# Patient Record
Sex: FEMALE | Race: WHITE | NOT HISPANIC OR LATINO | Employment: FULL TIME | URBAN - NONMETROPOLITAN AREA
[De-identification: names, ages, dates, MRNs, and addresses within clinical notes are randomized per-mention and may not be internally consistent; named-entity substitution may affect disease eponyms.]

---

## 2017-07-17 ENCOUNTER — OFFICE VISIT (OUTPATIENT)
Dept: URGENT CARE | Facility: PHYSICIAN GROUP | Age: 22
End: 2017-07-17
Payer: COMMERCIAL

## 2017-07-17 VITALS
WEIGHT: 149 LBS | SYSTOLIC BLOOD PRESSURE: 136 MMHG | BODY MASS INDEX: 25.44 KG/M2 | TEMPERATURE: 98.1 F | HEART RATE: 90 BPM | OXYGEN SATURATION: 95 % | HEIGHT: 64 IN | DIASTOLIC BLOOD PRESSURE: 88 MMHG

## 2017-07-17 DIAGNOSIS — S90.31XA CONTUSION OF RIGHT FOOT, INITIAL ENCOUNTER: ICD-10-CM

## 2017-07-17 PROCEDURE — 99213 OFFICE O/P EST LOW 20 MIN: CPT | Performed by: PHYSICIAN ASSISTANT

## 2017-07-17 NOTE — PROGRESS NOTES
Chief Complaint   Patient presents with   • Foot Problem     Right foot injury/whole foot now hurting x2 days ago patient dropped car part on foot Was prev seem [place] [facility] would like 2nd opinion        HISTORY OF PRESENT ILLNESS: Patient is a 22 y.o. female who presents today for evaluation of right foot pain. Patient dropped a car part on her right foot on Saturday, 2 days ago. She was seen in the emergency room in [place]. She had a negative x-ray but was upset that after she was told it wasn't broken they just sent her out of the ER without any information. Patient is having a hard time walking on it. She was given some ibuprofen 800s seemed to take the edge off but she continues to have swelling. She denies distal paresthesias.    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    Current Outpatient Prescriptions Ordered in [facility]   Medication Sig Dispense Refill   • norgestrel-ethinyl estradiol (LO-OVRAL) 0.3-30 MG-MCG Tab Take 1 Tab by mouth every day.     • LEVOXYL 112 MCG TABS Take 1 Tab by mouth every day. 30 Tab 3   • guaifenesin-codeine (CHERATUSSIN AC) Solution oral solution Take 5 mL by mouth every four hours as needed for Cough. 120 mL 0   • Prenatal Multivit-Min-Fe-FA (PRE-MIGUEL PO) Take  by mouth.       No current Epic-ordered facility-administered medications on file.       Past Medical History   Diagnosis Date   • Unspecified disorder of thyroid        Social History   Substance Use Topics   • Smoking status: Current Every Day Smoker -- 0.50 packs/day for 4 years     Types: Cigarettes   • Smokeless tobacco: Never Used   • Alcohol Use: No      Comment: 1 per week       No family status information on file.   No family history on file.    ROS:   Review of Systems   Constitutional: Negative for fever, chills, weight loss and malaise/fatigue.   HENT: Negative for  "ear pain, nosebleeds, congestion, sore throat and neck pain.    Eyes: Negative for blurred vision.   Respiratory: Negative for cough, sputum production, shortness of breath and wheezing.    Cardiovascular: Negative for chest pain, palpitations, orthopnea and leg swelling.   Gastrointestinal: Negative for heartburn, nausea, vomiting and abdominal pain.   Genitourinary: Negative for dysuria, urgency and frequency.       Exam:  Blood pressure 136/88, pulse 90, temperature 36.7 °C (98.1 °F), height 1.626 m (5' 4.02\"), weight 67.586 kg (149 lb), SpO2 95 %.  General: Normal appearing. No distress.  HEENT: Head is grossly normal.  Pulmonary: No respiratory distress noted.  Cardiovascular: Cap refill is less than 2 seconds in the right foot.  Neurologic: No sensory deficit noted.  Extremities: Diffuse edema of the right foot with ecchymosis around the toes. Antalgic gait noted. No localized tenderness noted.  Skin: No obvious lesions.  Psych: Normal mood. Alert and oriented x3. Judgment and insight is normal.    Assessment/Plan:  Discussed with patient that if she had a negative x-ray 2 days ago without additional injury, it is unlikely that there would be any new findings on x-ray. Recommend follow-up with primary care provider for repeat x-ray in 7-10 days to rule out occult fracture. Continue ibuprofen as directed. Discussed appropriate over-the-counter symptomatic medication, and when to return to clinic.  1. Contusion of right foot, initial encounter           "

## 2017-07-17 NOTE — MR AVS SNAPSHOT
"        Bernard Kendall   2017 9:35 AM   Office Visit   MRN: [id number]    Department:  [facility]   Dept Phone:  [phone]    Description:  Female : 1995   Provider:  Loretta Galarza PA-C           Reason for Visit     Foot Problem Right foot injury/whole foot now hurting x2 days ago patient dropped car part on foot Was prev seem [facility] would like 2nd opinion       Allergies as of 2017     No Known Allergies      You were diagnosed with     Contusion of right foot, initial encounter   [[id number]]         Vital Signs     Blood Pressure Pulse Temperature Height Weight Body Mass Index    136/88 mmHg 90 36.7 °C (98.1 °F) 1.626 m (5' 4.02\") 67.586 kg (149 lb) 25.56 kg/m2    Oxygen Saturation Smoking Status                95% Current Every Day Smoker          Basic Information     Date Of Birth Sex Race Ethnicity Preferred Language    1995 Female White Non- English      Problem List              ICD-10-CM Priority Class Noted - Resolved    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      Below and/or attached are the medications your provider expects you to take. Review all of your home medications and newly ordered medications with your provider and/or pharmacist. Follow medication instructions as directed by your provider and/or pharmacist. Please keep your medication list with you and share with your provider. Update the information when " medications are discontinued, doses are changed, or new medications (including over-the-counter products) are added; and carry medication information at all times in the event of emergency situations     Allergies:  No Known Allergies          Medications  Valid as of: July 17, 2017 - 11:08 AM    Generic Name Brand Name Tablet Size Instructions for use    Guaifenesin-Codeine (Solution) ROBITUSSIN -10 mg/5mL Take 5 mL by mouth every four hours as needed for Cough.        Levothyroxine Sodium (Tab) LEVOXYL 112 MCG Take 1 Tab by mouth every day.        Norgestrel-Ethinyl Estradiol (Tab) LO-OVRAL 0.3-30 MG-MCG Take 1 Tab by mouth every day.        Prenatal Multivit-Min-Fe-FA   Take  by mouth.        .                 Medicines prescribed today were sent to:     [facility] [address] [place] [address] - [address] [facility] [address] [place] [address] & [place]    [address] [facility] [address] [facility] [address]    Phone: [phone] Fax: [phone]    Open 24 Hours?: No      Medication refill instructions:       If your prescription bottle indicates you have medication refills left, it is not necessary to call your provider’s office. Please contact your pharmacy and they will refill your medication.    If your prescription bottle indicates you do not have any refills left, you may request refills at any time through one of the following ways: The online Couplewise system (except Urgent Care), by calling your provider’s office, or by asking your pharmacy to contact your provider’s office with a refill request. Medication refills are processed only during regular business hours and may not be available until the next business day. Your provider may request additional information or to have a follow-up visit with you prior to refilling your medication.   *Please Note: Medication refills are assigned a new Rx number when refilled electronically. Your pharmacy may indicate that no refills were authorized even though a new  prescription for the same medication is available at the pharmacy. Please request the medicine by name with the pharmacy before contacting your provider for a refill.           Buzzmove Access Code: [email or web address]  Expires: 8/16/2017 11:08 AM    Buzzmove  A secure, online tool to manage your health information     [employer]’s Buzzmove® is a secure, online tool that connects you to your personalized health information from the privacy of your home -- day or night - making it very easy for you to manage your healthcare. Once the activation process is completed, you can even access your medical information using the Buzzmove rebel, which is available for free in the Apple Rebel store or Google Play store.     Buzzmove provides the following levels of access (as shown below):   My Chart Features   [facility] Doctor [facility]  Urgent  Care Non-[facility]  Primary Care  Doctor   Email your healthcare team securely and privately 24/7 X X X    Manage appointments: schedule your next appointment; view details of past/upcoming appointments X      Request prescription refills. X      View recent personal medical records, including lab and immunizations X X X X   View health record, including health history, allergies, medications X X X X   Read reports about your outpatient visits, procedures, consult and ER notes X X X X   See your discharge summary, which is a recap of your hospital and/or ER visit that includes your diagnosis, lab results, and care plan. X X       How to register for Buzzmove:  1. Go to  [email or web address].  2. Click on the Sign Up Now box, which takes you to the New Member Sign Up page. You will need to provide the following information:  a. Enter your Buzzmove Access Code exactly as it appears at the top of this page. (You will not need to use this code after you’ve completed the sign-up process. If you do not sign up before the expiration date, you must request a new code.)    b. Enter your date of birth.   c. Enter your home email address.   d. Click Submit, and follow the next screen’s instructions.  3. Create a Immunetics ID. This will be your Immunetics login ID and cannot be changed, so think of one that is secure and easy to remember.  4. Create a Work4t password. You can change your password at any time.  5. Enter your Password Reset Question and Answer. This can be used at a later time if you forget your password.   6. Enter your e-mail address. This allows you to receive e-mail notifications when new information is available in Immunetics.  7. Click Sign Up. You can now view your health information.    For assistance activating your Immunetics account, call [phone]        Quit Tobacco Information     Do you want to quit using tobacco?    Quitting tobacco decreases risks of cancer, heart and lung disease, increases life expectancy, improves sense of taste and smell, and increases spending money, among other benefits.    If you are thinking about quitting, we can help.  • [facility] Quit Tobacco Program: [phone]  o Program occurs weekly for four weeks and includes pharmacist consultation on products to support quitting smoking or chewing tobacco. A provider referral is needed for pharmacist consultation.  • Tobacco Users Help Hotline: 7-212-QUITNOW (027-0369) or https://[place].quitlogix.org/  o Free, confidential telephone and online coaching for [place] residents. Sessions are designed on a schedule that is convenient for you. Eligible clients receive free nicotine replacement therapy.  • Nationally: www.smokefree.gov  o Information and professional assistance to support both immediate and long-term needs as you become, and remain, a non-smoker. Smokefree.gov allows you to choose the help that best fits your needs.

## 2018-03-07 ENCOUNTER — GYNECOLOGY VISIT (OUTPATIENT)
Dept: OBGYN | Facility: CLINIC | Age: 23
End: 2018-03-07
Payer: COMMERCIAL

## 2018-03-07 ENCOUNTER — HOSPITAL ENCOUNTER (OUTPATIENT)
Facility: MEDICAL CENTER | Age: 23
End: 2018-03-07
Attending: OBSTETRICS & GYNECOLOGY
Payer: COMMERCIAL

## 2018-03-07 VITALS
WEIGHT: 149 LBS | HEIGHT: 64 IN | DIASTOLIC BLOOD PRESSURE: 62 MMHG | BODY MASS INDEX: 25.44 KG/M2 | SYSTOLIC BLOOD PRESSURE: 90 MMHG

## 2018-03-07 DIAGNOSIS — Z01.419 WELL WOMAN EXAM: ICD-10-CM

## 2018-03-07 DIAGNOSIS — Z12.4 ROUTINE CERVICAL SMEAR: ICD-10-CM

## 2018-03-07 DIAGNOSIS — Z11.3 SCREENING EXAMINATION FOR VENEREAL DISEASE: ICD-10-CM

## 2018-03-07 PROCEDURE — 87491 CHLMYD TRACH DNA AMP PROBE: CPT

## 2018-03-07 PROCEDURE — 99395 PREV VISIT EST AGE 18-39: CPT | Performed by: OBSTETRICS & GYNECOLOGY

## 2018-03-07 PROCEDURE — 87591 N.GONORRHOEAE DNA AMP PROB: CPT

## 2018-03-07 PROCEDURE — 88175 CYTOPATH C/V AUTO FLUID REDO: CPT

## 2018-03-07 NOTE — PROGRESS NOTES
Bernard Kendall is a 22 y.o.  female who presents for her Annual Gynecologic Exam      HPI Comments: Pt presents for her annual well woman exam.   Pt has no complaints. Wants to get pregnant.  Patient's last menstrual period was 2018.  Prior patient of mine from [facility]    Review of Systems:   Pertinent positives documented in HPI and all other systems reviewed & are negative    PGYN Hx: no abnl pap, no STDs    All PMH, PSH, allergies, social history and FH reviewed and updated today:  Past Medical History:   Diagnosis Date   • Unspecified disorder of thyroid        Past Surgical History:   Procedure Laterality Date   • THYROIDECTOMY TOTAL  2014    Performed by Nahun Angulo M.D. at SURGERY [facility] ORS   • PRIMARY C SECTION         Medications:   Current Outpatient Prescriptions Ordered in [facility]   Medication Sig Dispense Refill   • norgestrel-ethinyl estradiol (LO-OVRAL) 0.3-30 MG-MCG Tab Take 1 Tab by mouth every day.     • guaifenesin-codeine (CHERATUSSIN AC) Solution oral solution Take 5 mL by mouth every four hours as needed for Cough. 120 mL 0   • Prenatal Multivit-Min-Fe-FA (PRE-MIGUEL PO) Take  by mouth.     • LEVOXYL 112 MCG TABS Take 1 Tab by mouth every day. 30 Tab 3     No current Epic-ordered facility-administered medications on file.        Patient has no known allergies.    Social History     Social History   • Marital status: Single     Spouse name: N/A   • Number of children: N/A   • Years of education: N/A     Social History Main Topics   • Smoking status: Current Every Day Smoker     Packs/day: 0.50     Years: 4.00     Types: Cigarettes   • Smokeless tobacco: Never Used   • Alcohol use No      Comment: 1 per week   • Drug use: No   • Sexual activity: Yes     Partners: Male     Other Topics Concern   • Not on file     Social History Narrative   • No narrative on file       History reviewed. No pertinent family history.       Objective:   Vital measurements:  Blood  "pressure (!) 90/62, height 1.626 m (5' 4\"), weight 67.6 kg (149 lb), last menstrual period 02/23/2018.  Body mass index is 25.58 kg/m². (Goal BM I>18 <25)    Physical Exam   Nursing note and vitals reviewed.  Constitutional: She is oriented to person, place, and time. She appears well-developed and well-nourished. No distress.     HEENT:   Head: Normocephalic and atraumatic.   Right Ear: External ear normal.   Left Ear: External ear normal.   Nose: Nose normal.   Eyes: Conjunctivae and EOM are normal. Pupils are equal, round, and reactive to light. No scleral icterus.     Neck: Normal range of motion. Neck supple. No tracheal deviation present. No thyromegaly present. No cervical or supraclavicular lymphadenopathy.    Pulmonary/Chest: Effort normal and breath sounds normal. No respiratory distress. She has no wheezes. She has no rales. She exhibits no tenderness.     Cardiovascular: Regular, rate and rhythm. No edema.    Breast: Symmetrical, normal consistency without masses., No dimpling or skin changes, Normal nipples without discharge, no axillary lymphadenopathy    Abdominal: Soft. Bowel sounds are normal. She exhibits no distension and no mass. No tenderness. She has no rebound and no guarding.     Genitourinary:  Pelvic exam was performed with patient supine.  External genitalia with no abnormal pigmentation, labial fusion, rashes, tenderness, lesions or injury to the labia bilaterally.  BUS normal  Vagina is pink and moist with no lesions, foul discharge, erythema, tenderness or bleeding. No foreign body around the vagina or signs of injury.   Cervix exhibits no motion tenderness, no discharge and no friability, no lesions.   Uterus is not deviated, not enlarged, not fixed and not tender.  Right adnexa displays no mass, no tenderness and no fullness.  Left adnexa displays no mass, no tenderness and no fullness.     Musculoskeletal: Normal range of motion. Non tender. She exhibits no edema and no tenderness. "     Lymphadenopathy: She has no cervical or supraclavicular adenopathy.     Neurological: She is alert and oriented to person, place, and time. She exhibits normal muscle tone.     Skin: Skin is warm and dry. No rash noted. She is not diaphoretic. No erythema. No pallor.     Psychiatric: She has a normal mood and affect. Her behavior is normal. Judgment and thought content normal.        Assessment:     1. Well woman exam  THINPREP RFLX HPV ASCUS W/CTNG   2. Routine cervical smear  THINPREP RFLX HPV ASCUS W/CTNG   3. Screening examination for venereal disease  THINPREP RFLX HPV ASCUS W/CTNG         Plan:   Pap and physical exam performed  Self breast awareness discussed.  Encouraged exercise and proper diet.  Start prenatal vitamins daily  Stop smoking  Mammograms starting @ age 40 annually.  See medications and orders placed in encounter report.  Follow up in 1 year for annual exam or sooner as needed

## 2018-03-08 LAB
C TRACH DNA GENITAL QL NAA+PROBE: NEGATIVE
CYTOLOGY REG CYTOL: NORMAL
N GONORRHOEA DNA GENITAL QL NAA+PROBE: NEGATIVE
SPECIMEN SOURCE: NORMAL

## 2018-04-24 ENCOUNTER — NON-PROVIDER VISIT (OUTPATIENT)
Dept: URGENT CARE | Facility: PHYSICIAN GROUP | Age: 23
End: 2018-04-24

## 2018-04-24 DIAGNOSIS — Z02.1 DRUG SCREENING, PRE-EMPLOYMENT: ICD-10-CM

## 2018-04-24 PROCEDURE — 7503 PR ESCREEN ACCT UDS COL ONLY: Performed by: PHYSICIAN ASSISTANT

## 2018-05-07 ENCOUNTER — TELEPHONE (OUTPATIENT)
Dept: OBGYN | Facility: MEDICAL CENTER | Age: 23
End: 2018-05-07

## 2018-05-07 NOTE — TELEPHONE ENCOUNTER
----- Message from Milagros Vasquez sent at 5/7/2018  1:51 PM PDT -----  Regarding: questions  Contact: [phone]  Patient called states she is about 5 weeks pregnant and is having nausea. Wants to know if she can get anything for it other then zofran because zofran does not work for her. Patient has a dub on 7/17.Thank you.

## 2018-05-10 RX ORDER — PROMETHAZINE HYDROCHLORIDE 25 MG/1
25 TABLET ORAL EVERY 6 HOURS PRN
Qty: 30 TAB | Refills: 0 | Status: SHIPPED | OUTPATIENT
Start: 2018-05-10 | End: 2018-08-15

## 2018-05-11 ENCOUNTER — TELEPHONE (OUTPATIENT)
Dept: OBGYN | Facility: MEDICAL CENTER | Age: 23
End: 2018-05-11

## 2018-05-11 NOTE — TELEPHONE ENCOUNTER
Called and informed the patient of medication, instructions, and  from the pharmacy. Pt had no questions at this time.

## 2018-05-11 NOTE — TELEPHONE ENCOUNTER
Called and informed the patient of what the doctor said which was,    ----- Message from Deidre Sánchez M.D. sent at 5/10/2018  5:36 PM PDT -----  Regarding: RE: questions  Contact: [phone]  Please let Bernard know I sent in phenergan tablets, they can cause drowsiness though, every 6 hours as needed

## 2018-05-11 NOTE — TELEPHONE ENCOUNTER
----- Message from Deidre Sánchez M.D. sent at 5/10/2018  5:36 PM PDT -----  Regarding: RE: questions  Contact: [phone]  Please let Bernard know I sent in phenergan tablets, they can cause drowsiness though, every 6 hours as needed    ----- Message -----  From: Skye Contreras, Med Ass't  Sent: 5/7/2018   1:59 PM  To: Deidre Sánchez M.D.  Subject: FW: questions                                    Doctor she is another Dr. Keenan pt but she is on vacation. Please advise?  ----- Message -----  From: Milagros Vasquez  Sent: 5/7/2018   1:51 PM  To: Skye Contreras, Med Ass't  Subject: questions                                        Patient called states she is about 5 weeks pregnant and is having nausea. Wants to know if she can get anything for it other then zofran because zofran does not work for her. Patient has a dub on 7/17.Thank you.

## 2018-05-14 ENCOUNTER — TELEPHONE (OUTPATIENT)
Dept: OBGYN | Facility: CLINIC | Age: 23
End: 2018-05-14

## 2018-05-14 NOTE — TELEPHONE ENCOUNTER
Patient need her RX that was sent in on 05/10/18 sent to the [facility]  in [place] .

## 2018-07-05 ENCOUNTER — GYNECOLOGY VISIT (OUTPATIENT)
Dept: OBGYN | Facility: CLINIC | Age: 23
End: 2018-07-05
Payer: COMMERCIAL

## 2018-07-05 VITALS
DIASTOLIC BLOOD PRESSURE: 80 MMHG | WEIGHT: 163 LBS | HEIGHT: 64 IN | BODY MASS INDEX: 27.83 KG/M2 | SYSTOLIC BLOOD PRESSURE: 108 MMHG

## 2018-07-05 DIAGNOSIS — Z36.3 ANTENATAL SCREENING FOR MALFORMATION USING ULTRASONICS: ICD-10-CM

## 2018-07-05 DIAGNOSIS — Z32.01 ENCOUNTER FOR PREGNANCY TEST, RESULT POSITIVE: ICD-10-CM

## 2018-07-05 DIAGNOSIS — O09.92 HIGH-RISK PREGNANCY, SECOND TRIMESTER: ICD-10-CM

## 2018-07-05 DIAGNOSIS — E89.0 POSTSURGICAL HYPOTHYROIDISM: ICD-10-CM

## 2018-07-05 DIAGNOSIS — N93.8 DUB (DYSFUNCTIONAL UTERINE BLEEDING): ICD-10-CM

## 2018-07-05 PROBLEM — E03.8 OTHER SPECIFIED HYPOTHYROIDISM: Status: ACTIVE | Noted: 2018-07-05

## 2018-07-05 PROCEDURE — 99214 OFFICE O/P EST MOD 30 MIN: CPT | Performed by: OBSTETRICS & GYNECOLOGY

## 2018-07-05 PROCEDURE — 76805 OB US >/= 14 WKS SNGL FETUS: CPT | Mod: 26 | Performed by: OBSTETRICS & GYNECOLOGY

## 2018-07-05 NOTE — PROGRESS NOTES
"Bernard Kendall,  23 y.o.  female with Patient's last menstrual period was 2018. presents today with complaint of dysfunctional uterine bleeding.    Subjective : Patient presents to the office for absent menses.    Nausea/Vomiting: Sometimes:    Abdominal /pelvic cramping : No :  Vaginal bleeding:No    Pertinent positives documented in HPI and all other systems reviewed & are negative    OB History    Para Term  AB Living   2 1 0 1   1   SAB TAB Ectopic Molar Multiple Live Births             1      # Outcome Date GA Lbr Gonzalo/2nd Weight Sex Delivery Anes PTL Lv   2 Current            1  04/02/15 30w0d  1.531 kg (3 lb 6 oz)  CS-LTranv   JOSELUIS      Complications: Fetal Intolerance      Birth Comments: Non-reassuring fetal surveillance, emergency , wound infection postpartum          Past Gyn history: pap normal 2018  Current Birth control:  none    Past Medical History:   Diagnosis Date   • Unspecified disorder of thyroid      Past Surgical History:   Procedure Laterality Date   • THYROIDECTOMY TOTAL  2014    Performed by Nahun Angulo M.D. at SURGERY [facility] ORS   • PRIMARY C SECTION       Meds: PNV, phenergan    Allergies: Patient has no known allergies.    Physical Exam:    /80   Ht 1.626 m (5' 4\")   Wt 73.9 kg (163 lb)   LMP 2018   BMI 27.98 kg/m²   Gen: well-appearing, well-hydrated, well-nourished  HEENT: normal; PERRLA, EOMI, sclera clear  Lungs: Clear to auscultation  Heart: RRR No M  Abd: abdomen is soft without significant tenderness, masses, organomegaly or guarding  Pelvic: deferred  Ext: NT bilaterally, no cyanosis, clubbing or edema    No results found for this or any previous visit (from the past 336 hour(s)).    Transabdominal US performed and per my read:    Indication: DUB, pos preg test, > 14wks by LMP    Findings: ritter intrauterine pregnancy @ 14w2d by CRL.   CRL 83.6mm   Posterior placenta  Variable presentation of " fetus  Positive fetal cardiac activity @ 160 BPM.   Right ovary not seen abd. Left Ovary not seen abd. Cervical length not seen abd.   Amniotic fluid visibly adequte    Impression: viable IUP @ 14w2d. EDC by US of 2019      Assessment:  23 y.o.  at 14w5d by sure LMP, today's u/s consistent  dysfunctional uterine bleeding  Pregnancy exam/test positive  High risk pregnancy due to hypothyroidism  Hx  x 1 for NRFS    Plan:  4 weeks for new OB appt  Pap smear up to date  Normal pregnancy symptoms discussed  SAB/labor precautions educated  Ordered prenatal labs, AFP, TSH  Anatomy u/s ordered  Declines referral to M  Drink at least 2 liters of water daily  Exercise 30 minutes daily  Call MD w/ questions or concerns    Spent  25 minutes in face-to-face patient contact in which greater than 50% of that visit was spent in counseling/coordination of care including medical and surgical options of care.

## 2018-07-12 ENCOUNTER — HOSPITAL ENCOUNTER (OUTPATIENT)
Dept: LAB | Facility: MEDICAL CENTER | Age: 23
End: 2018-07-12
Attending: OBSTETRICS & GYNECOLOGY
Payer: COMMERCIAL

## 2018-07-12 DIAGNOSIS — O09.92 HIGH-RISK PREGNANCY, SECOND TRIMESTER: ICD-10-CM

## 2018-07-12 DIAGNOSIS — E89.0 POSTSURGICAL HYPOTHYROIDISM: ICD-10-CM

## 2018-07-12 LAB
ABO GROUP BLD: NORMAL
APPEARANCE UR: CLEAR
BACTERIA #/AREA URNS HPF: ABNORMAL /HPF
BASOPHILS # BLD AUTO: 0.5 % (ref 0–1.8)
BASOPHILS # BLD: 0.05 K/UL (ref 0–0.12)
BILIRUB UR QL STRIP.AUTO: NEGATIVE
BLD GP AB SCN SERPL QL: NORMAL
COLOR UR: YELLOW
EOSINOPHIL # BLD AUTO: 0.07 K/UL (ref 0–0.51)
EOSINOPHIL NFR BLD: 0.7 % (ref 0–6.9)
EPI CELLS #/AREA URNS HPF: ABNORMAL /HPF
ERYTHROCYTE [DISTWIDTH] IN BLOOD BY AUTOMATED COUNT: 43 FL (ref 35.9–50)
GLUCOSE UR STRIP.AUTO-MCNC: NEGATIVE MG/DL
HBV SURFACE AG SER QL: NEGATIVE
HCT VFR BLD AUTO: 41.1 % (ref 37–47)
HGB BLD-MCNC: 14.3 G/DL (ref 12–16)
HIV 1+2 AB+HIV1 P24 AG SERPL QL IA: NON REACTIVE
HYALINE CASTS #/AREA URNS LPF: ABNORMAL /LPF
IMM GRANULOCYTES # BLD AUTO: 0.03 K/UL (ref 0–0.11)
IMM GRANULOCYTES NFR BLD AUTO: 0.3 % (ref 0–0.9)
KETONES UR STRIP.AUTO-MCNC: NEGATIVE MG/DL
LEUKOCYTE ESTERASE UR QL STRIP.AUTO: ABNORMAL
LYMPHOCYTES # BLD AUTO: 2.33 K/UL (ref 1–4.8)
LYMPHOCYTES NFR BLD: 23 % (ref 22–41)
MCH RBC QN AUTO: 33 PG (ref 27–33)
MCHC RBC AUTO-ENTMCNC: 34.8 G/DL (ref 33.6–35)
MCV RBC AUTO: 94.9 FL (ref 81.4–97.8)
MICRO URNS: ABNORMAL
MONOCYTES # BLD AUTO: 0.66 K/UL (ref 0–0.85)
MONOCYTES NFR BLD AUTO: 6.5 % (ref 0–13.4)
NEUTROPHILS # BLD AUTO: 7 K/UL (ref 2–7.15)
NEUTROPHILS NFR BLD: 69 % (ref 44–72)
NITRITE UR QL STRIP.AUTO: NEGATIVE
NRBC # BLD AUTO: 0 K/UL
NRBC BLD-RTO: 0 /100 WBC
PH UR STRIP.AUTO: 8 [PH]
PLATELET # BLD AUTO: 295 K/UL (ref 164–446)
PMV BLD AUTO: 10 FL (ref 9–12.9)
PROT UR QL STRIP: NEGATIVE MG/DL
RBC # BLD AUTO: 4.33 M/UL (ref 4.2–5.4)
RBC # URNS HPF: ABNORMAL /HPF
RBC UR QL AUTO: NEGATIVE
RH BLD: NORMAL
RUBV AB SER QL: 137.4 IU/ML
SP GR UR STRIP.AUTO: 1.02
T4 FREE SERPL-MCNC: 0.88 NG/DL (ref 0.53–1.43)
TREPONEMA PALLIDUM IGG+IGM AB [PRESENCE] IN SERUM OR PLASMA BY IMMUNOASSAY: NON REACTIVE
TSH SERPL DL<=0.005 MIU/L-ACNC: 3.58 UIU/ML (ref 0.38–5.33)
UROBILINOGEN UR STRIP.AUTO-MCNC: 1 MG/DL
WBC # BLD AUTO: 10.1 K/UL (ref 4.8–10.8)
WBC #/AREA URNS HPF: ABNORMAL /HPF

## 2018-07-12 PROCEDURE — 84439 ASSAY OF FREE THYROXINE: CPT

## 2018-07-12 PROCEDURE — 87340 HEPATITIS B SURFACE AG IA: CPT

## 2018-07-12 PROCEDURE — 81511 FTL CGEN ABNOR FOUR ANAL: CPT

## 2018-07-12 PROCEDURE — 85025 COMPLETE CBC W/AUTO DIFF WBC: CPT

## 2018-07-12 PROCEDURE — 86901 BLOOD TYPING SEROLOGIC RH(D): CPT

## 2018-07-12 PROCEDURE — 86780 TREPONEMA PALLIDUM: CPT

## 2018-07-12 PROCEDURE — 84443 ASSAY THYROID STIM HORMONE: CPT

## 2018-07-12 PROCEDURE — 86850 RBC ANTIBODY SCREEN: CPT

## 2018-07-12 PROCEDURE — 36415 COLL VENOUS BLD VENIPUNCTURE: CPT

## 2018-07-12 PROCEDURE — 86762 RUBELLA ANTIBODY: CPT

## 2018-07-12 PROCEDURE — 81001 URINALYSIS AUTO W/SCOPE: CPT

## 2018-07-12 PROCEDURE — 86900 BLOOD TYPING SEROLOGIC ABO: CPT

## 2018-07-12 PROCEDURE — 87389 HIV-1 AG W/HIV-1&-2 AB AG IA: CPT

## 2018-07-13 PROBLEM — O09.92 HIGH-RISK PREGNANCY, SECOND TRIMESTER: Status: ACTIVE | Noted: 2018-07-13

## 2018-07-15 LAB
# FETUSES US: NORMAL
AFP MOM SERPL: 0.97
AFP SERPL-MCNC: 28 NG/ML
AGE - REPORTED: 24.6 YR
CURRENT SMOKER: NO
FAMILY MEMBER DISEASES HX: NO
GA METHOD: NORMAL
GA: NORMAL WK
HCG MOM SERPL: 0.68
HCG SERPL-ACNC: NORMAL IU/L
HX OF HEREDITARY DISORDERS: NO
IDDM PATIENT QL: NO
INHIBIN A MOM SERPL: 1.07
INHIBIN A SERPL-MCNC: 183 PG/ML
INTEGRATED SCN PATIENT-IMP: NORMAL
PATHOLOGY STUDY: NORMAL
SPECIMEN DRAWN SERPL: NORMAL
U ESTRIOL MOM SERPL: 1.04
U ESTRIOL SERPL-MCNC: 0.79 NG/ML

## 2018-08-15 ENCOUNTER — INITIAL PRENATAL (OUTPATIENT)
Dept: OBGYN | Facility: CLINIC | Age: 23
End: 2018-08-15
Payer: COMMERCIAL

## 2018-08-15 VITALS — BODY MASS INDEX: 28.67 KG/M2 | SYSTOLIC BLOOD PRESSURE: 90 MMHG | WEIGHT: 167 LBS | DIASTOLIC BLOOD PRESSURE: 54 MMHG

## 2018-08-15 DIAGNOSIS — Z34.82 ENCOUNTER FOR SUPERVISION OF OTHER NORMAL PREGNANCY IN SECOND TRIMESTER: ICD-10-CM

## 2018-08-15 DIAGNOSIS — E89.0 POSTSURGICAL HYPOTHYROIDISM: ICD-10-CM

## 2018-08-15 DIAGNOSIS — O09.92 HIGH-RISK PREGNANCY, SECOND TRIMESTER: ICD-10-CM

## 2018-08-15 PROCEDURE — 90040 PR PRENATAL FOLLOW UP: CPT | Performed by: OBSTETRICS & GYNECOLOGY

## 2018-08-15 RX ORDER — LEVOTHYROXINE SODIUM 0.12 MG/1
125 TABLET ORAL
Qty: 30 TAB | Refills: 11 | Status: SHIPPED | OUTPATIENT
Start: 2018-08-15 | End: 2023-10-17

## 2018-08-15 RX ORDER — FAMOTIDINE 20 MG/1
20 TABLET, FILM COATED ORAL 2 TIMES DAILY
Qty: 60 TAB | Refills: 5 | Status: SHIPPED | OUTPATIENT
Start: 2018-08-15 | End: 2023-10-17

## 2018-08-15 NOTE — PROGRESS NOTES
S: Pt presents for routine OB follow up.  No contractions, vaginal bleeding, or leaking fluids. Good fetal movement.  Has heartburn, tums not helping.    Questions answered.    O: BP (!) 90/54   Wt 75.8 kg (167 lb)   LMP 2018   BMI 28.67 kg/m²   Patients' weight gain, fluid intake and exercise level discussed.  Vitals, fundal height , fetal position, and FHR reviewed on flowsheet    Lab:No results found for this or any previous visit (from the past 336 hour(s)).    A/P:  23 y.o.  at 20w4d presents for routine obstetric follow-up.  Size equals dates and/or scan    1.  Continue prenatal vitamins.  2.  Begin kick counts at 28 weeks.  3.  Exercise at least 30 minutes daily.  4.  Increase water intake.  5.  Follow-up in 4 weeks.  6.  Rx of Pepcid 20mg BID for tx of heartburn

## 2018-08-20 ENCOUNTER — HOSPITAL ENCOUNTER (OUTPATIENT)
Dept: RADIOLOGY | Facility: MEDICAL CENTER | Age: 23
End: 2018-08-20
Attending: OBSTETRICS & GYNECOLOGY
Payer: COMMERCIAL

## 2018-08-20 DIAGNOSIS — Z36.3 ANTENATAL SCREENING FOR MALFORMATION USING ULTRASONICS: ICD-10-CM

## 2018-08-20 PROCEDURE — 76805 OB US >/= 14 WKS SNGL FETUS: CPT

## 2018-08-24 ENCOUNTER — DATING (OUTPATIENT)
Dept: OBGYN | Facility: CLINIC | Age: 23
End: 2018-08-24

## 2018-09-07 PROBLEM — O34.219 HX OF CESAREAN SECTION COMPLICATING PREGNANCY: Status: ACTIVE | Noted: 2018-09-07

## 2018-09-13 ENCOUNTER — ROUTINE PRENATAL (OUTPATIENT)
Dept: OBGYN | Facility: CLINIC | Age: 23
End: 2018-09-13
Payer: COMMERCIAL

## 2018-09-13 VITALS — WEIGHT: 173 LBS | BODY MASS INDEX: 29.7 KG/M2 | SYSTOLIC BLOOD PRESSURE: 102 MMHG | DIASTOLIC BLOOD PRESSURE: 62 MMHG

## 2018-09-13 DIAGNOSIS — O09.892 SUPERVISION OF OTHER HIGH RISK PREGNANCIES, SECOND TRIMESTER: ICD-10-CM

## 2018-09-13 DIAGNOSIS — E89.0 POSTSURGICAL HYPOTHYROIDISM: ICD-10-CM

## 2018-09-13 DIAGNOSIS — O34.219 HX OF CESAREAN SECTION COMPLICATING PREGNANCY: ICD-10-CM

## 2018-09-13 PROCEDURE — 90040 PR PRENATAL FOLLOW UP: CPT | Performed by: OBSTETRICS & GYNECOLOGY

## 2018-09-13 NOTE — PROGRESS NOTES
S: Pt presents for routine OB follow up.  No contractions, vaginal bleeding, or leaking fluids. Good fetal movement.    Questions answered.    O: /62   Wt 78.5 kg (173 lb)   LMP 2018   BMI 29.70 kg/m²   Patients' weight gain, fluid intake and exercise level discussed.  Vitals, fundal height , fetal position, and FHR reviewed on flowsheet    Lab:No results found for this or any previous visit (from the past 336 hour(s)).    A/P:  23 y.o.  at 24w5d presents for routine obstetric follow-up.  Size equals dates and/or scan    1.  Continue prenatal vitamins.  2.  Begin kick counts at 28 weeks.  3.  Exercise at least 30 minutes daily.  4.  Increase water intake.  5.  Follow-up in 4 weeks.  6.  28 week lab orders given to patient  7.  TSH, free T4  8.  Growth u/s ordered

## 2018-09-13 NOTE — PROGRESS NOTES
Pt here today for OB follow up @24w5d  Pt denies leaking fluids  Reports +FM  Pharmacy Confirmed.

## 2018-09-28 ENCOUNTER — HOSPITAL ENCOUNTER (OUTPATIENT)
Dept: LAB | Facility: MEDICAL CENTER | Age: 23
End: 2018-09-28
Attending: OBSTETRICS & GYNECOLOGY
Payer: COMMERCIAL

## 2018-09-28 DIAGNOSIS — O09.892 SUPERVISION OF OTHER HIGH RISK PREGNANCIES, SECOND TRIMESTER: ICD-10-CM

## 2018-09-28 LAB
ERYTHROCYTE [DISTWIDTH] IN BLOOD BY AUTOMATED COUNT: 46.3 FL (ref 35.9–50)
GLUCOSE 1H P 50 G GLC PO SERPL-MCNC: 145 MG/DL (ref 70–139)
HCT VFR BLD AUTO: 39.5 % (ref 37–47)
HGB BLD-MCNC: 13 G/DL (ref 12–16)
MCH RBC QN AUTO: 32.3 PG (ref 27–33)
MCHC RBC AUTO-ENTMCNC: 32.9 G/DL (ref 33.6–35)
MCV RBC AUTO: 98 FL (ref 81.4–97.8)
PLATELET # BLD AUTO: 292 K/UL (ref 164–446)
PMV BLD AUTO: 9.9 FL (ref 9–12.9)
RBC # BLD AUTO: 4.03 M/UL (ref 4.2–5.4)
T4 FREE SERPL-MCNC: 0.65 NG/DL (ref 0.53–1.43)
TREPONEMA PALLIDUM IGG+IGM AB [PRESENCE] IN SERUM OR PLASMA BY IMMUNOASSAY: NON REACTIVE
TSH SERPL DL<=0.005 MIU/L-ACNC: 5.26 UIU/ML (ref 0.38–5.33)
WBC # BLD AUTO: 13.4 K/UL (ref 4.8–10.8)

## 2018-09-28 PROCEDURE — 84439 ASSAY OF FREE THYROXINE: CPT

## 2018-09-28 PROCEDURE — 85027 COMPLETE CBC AUTOMATED: CPT

## 2018-09-28 PROCEDURE — 86780 TREPONEMA PALLIDUM: CPT

## 2018-09-28 PROCEDURE — 84443 ASSAY THYROID STIM HORMONE: CPT

## 2018-09-28 PROCEDURE — 36415 COLL VENOUS BLD VENIPUNCTURE: CPT

## 2018-09-28 PROCEDURE — 82950 GLUCOSE TEST: CPT

## 2018-09-30 DIAGNOSIS — O99.810 ABNORMAL GLUCOSE TOLERANCE TEST (GTT) DURING PREGNANCY, ANTEPARTUM: ICD-10-CM

## 2018-10-01 ENCOUNTER — TELEPHONE (OUTPATIENT)
Dept: OBGYN | Facility: CLINIC | Age: 23
End: 2018-10-01

## 2018-10-01 NOTE — TELEPHONE ENCOUNTER
Called patient to let her know her one hour glucose came back elevated. Patient was told she will now need a 3 hr glucose. She was indicated to be fasting 8 hours prior to going to the lab. Patient verbalized understanding and had no further questions.

## 2018-10-09 ENCOUNTER — HOSPITAL ENCOUNTER (OUTPATIENT)
Dept: LAB | Facility: MEDICAL CENTER | Age: 23
End: 2018-10-09
Attending: OBSTETRICS & GYNECOLOGY
Payer: COMMERCIAL

## 2018-10-09 DIAGNOSIS — O99.810 ABNORMAL GLUCOSE TOLERANCE TEST (GTT) DURING PREGNANCY, ANTEPARTUM: ICD-10-CM

## 2018-10-09 LAB
GLUCOSE 1H P CHAL SERPL-MCNC: 173 MG/DL (ref 65–199)
GLUCOSE 3H P CHAL SERPL-MCNC: 62 MG/DL (ref 65–109)
GLUCOSE BS SERPL-MCNC: 81 MG/DL (ref 65–99)

## 2018-10-09 PROCEDURE — 82951 GLUCOSE TOLERANCE TEST (GTT): CPT

## 2018-10-09 PROCEDURE — 82952 GTT-ADDED SAMPLES: CPT

## 2018-10-09 PROCEDURE — 36415 COLL VENOUS BLD VENIPUNCTURE: CPT

## 2018-10-11 ENCOUNTER — ROUTINE PRENATAL (OUTPATIENT)
Dept: OBGYN | Facility: CLINIC | Age: 23
End: 2018-10-11
Payer: COMMERCIAL

## 2018-10-11 VITALS — DIASTOLIC BLOOD PRESSURE: 66 MMHG | WEIGHT: 179 LBS | BODY MASS INDEX: 30.73 KG/M2 | SYSTOLIC BLOOD PRESSURE: 102 MMHG

## 2018-10-11 DIAGNOSIS — E89.0 POSTSURGICAL HYPOTHYROIDISM: ICD-10-CM

## 2018-10-11 DIAGNOSIS — O09.893 SUPERVISION OF OTHER HIGH RISK PREGNANCIES, THIRD TRIMESTER: ICD-10-CM

## 2018-10-11 DIAGNOSIS — O34.219 HX OF CESAREAN SECTION COMPLICATING PREGNANCY: ICD-10-CM

## 2018-10-11 PROBLEM — O99.810 ABNORMAL GLUCOSE TOLERANCE TEST (GTT) DURING PREGNANCY, ANTEPARTUM: Status: RESOLVED | Noted: 2018-09-30 | Resolved: 2018-10-11

## 2018-10-11 PROCEDURE — 90715 TDAP VACCINE 7 YRS/> IM: CPT | Performed by: OBSTETRICS & GYNECOLOGY

## 2018-10-11 PROCEDURE — 90472 IMMUNIZATION ADMIN EACH ADD: CPT | Performed by: OBSTETRICS & GYNECOLOGY

## 2018-10-11 PROCEDURE — 90040 PR PRENATAL FOLLOW UP: CPT | Performed by: OBSTETRICS & GYNECOLOGY

## 2018-10-11 PROCEDURE — 90471 IMMUNIZATION ADMIN: CPT | Performed by: OBSTETRICS & GYNECOLOGY

## 2018-10-11 PROCEDURE — 90686 IIV4 VACC NO PRSV 0.5 ML IM: CPT | Performed by: OBSTETRICS & GYNECOLOGY

## 2018-10-11 NOTE — PROGRESS NOTES
S: Pt presents for routine OB follow up. Good fetal movement.  No contractions, vaginal bleeding, or leakage of fluid.    Questions answered.    O: /66   Wt 81.2 kg (179 lb)   LMP 2018   BMI 30.73 kg/m²   Patients' weight gain, fluid intake and exercise level discussed.  Vitals, fundal height , fetal position, and FHR reviewed on flowsheet    Lab:  Recent Results (from the past 336 hour(s))   CBC WITHOUT DIFFERENTIAL    Collection Time: 18  9:55 AM   Result Value Ref Range    WBC 13.4 (H) 4.8 - 10.8 K/uL    RBC 4.03 (L) 4.20 - 5.40 M/uL    Hemoglobin 13.0 12.0 - 16.0 g/dL    Hematocrit 39.5 37.0 - 47.0 %    MCV 98.0 (H) 81.4 - 97.8 fL    MCH 32.3 27.0 - 33.0 pg    MCHC 32.9 (L) 33.6 - 35.0 g/dL    RDW 46.3 35.9 - 50.0 fL    Platelet Count 292 164 - 446 K/uL    MPV 9.9 9.0 - 12.9 fL   GLUCOSE 1HR GESTATIONAL    Collection Time: 18  9:55 AM   Result Value Ref Range    Glucose, Post Dose 145 (H) 70 - 139 mg/dL   T.PALLIDUM AB EIA    Collection Time: 18  9:55 AM   Result Value Ref Range    Syphilis, Treponemal Qual Non Reactive Non Reactive   TSH    Collection Time: 18  9:55 AM   Result Value Ref Range    TSH 5.260 0.380 - 5.330 uIU/mL   FREE THYROXINE    Collection Time: 18  9:55 AM   Result Value Ref Range    Free T-4 0.65 0.53 - 1.43 ng/dL   GLUCOSE TOLERANCE 3 HOUR    Collection Time: 10/09/18  7:00 AM   Result Value Ref Range    Glucose, Fasting 81 65 - 99 mg/dL    Glucose 3 Hour 62 (L) 65 - 109 mg/dL    Glucose 1 Hour 173 65 - 199 mg/dL       A/P:  23 y.o.  at 28w5d presents for routine obstetric follow-up.  Size equals dates and/or scan    1.  Continue prenatal vitamins.  2.  Fetal kick counts.  3.  Exercise at least 30 minutes daily.  4.  Increase water intake.  5.  Labor precautions educated.  6.  Follow-up in 2 weeks.  7.  3hr GTT results reviewed and normal  8.  Growth u/s scheduled 10/16  9.  Tdap and flu vaccine today

## 2018-10-11 NOTE — PROGRESS NOTES
T-DAP GIVEN  NDC: 08152-486-78  LOT#: [id number]  Expiration Date: 2020    Dose: 0.5ML  Site: Right Arm    Patient educated on use and side effects of medication. Name and  verified prior to injection. Pt tolerated? YES     Verified by DV    Administered by Jaymie Moulton at 4:45 PM.    Patient Provided Medication: no    FLU SHOT GIVEN  NDC: 11868-453-97  LOT#: [id number]  Expiration Date: 19    Dose: 0.5ML  Site: Right Arm    Patient educated on use and side effects of medication. Name and  verified prior to injection. Pt tolerated? YES     Verified by DV    Administered by Jaymie Moulton at 4:46 PM.    Patient Provided Medication: no            Ob follow up. Good fetal movement. Pt has no complaints.   Phone # & pharmacy verified.  Kick count sheet given today.

## 2018-10-11 NOTE — LETTER
"Count Your Baby's Movements  Another step to a healthy delivery        How Many Weeks Pregnant? 28 wks 5 days  Date to Begin Counting: 10/11/18              How to use this chart    One way for your physician to keep track of your baby's health is by knowing how often the baby moves (or \"kicks\") in your womb.  You can help your physician to do this by using this chart every day.    Every day, you should see how many hours it takes for your baby to move 10 times.  Start in the morning, as soon as you get up.    · First, write down the time your baby moves until you get to 10.  · Check off one box every time your baby moves until you get to 10.  · Write down the time you finished counting in the last column.  · Total how long it took to count up all 10 movements.  · Finally, fill in the box that shows how long this took.  After counting 10 movements, you no longer have to count any more that day.  The next morning, just start counting again as soon as you get up.    What should you call a \"movement\"?  It is hard to say, because it will feel different from one mother to another and from one pregnancy to the next.  The important thing is that you count the movements the same way throughout your pregnancy.  If you have more questions, you should ask your physician.    Count carefully every day!  SAMPLE:  Week 28    How many hours did it take to feel 10 movements?       Start  Time     1     2     3     4     5     6     7     8     9     10   Finish Time   Mon 8:20 ·  ·  ·  ·  ·  ·  ·  ·  ·  ·  11:40   Tue Wed Thu Fri               Sat               Sun                 IMPORTANT: You should contact your physician if it takes more than two hours for you to feel 10 movements.  Each morning, write down the time and start to count the movements of your baby.  Keep track by checking off one box every time you feel one movement.  When you have felt 10 \"kicks\", write down the time you " finished counting in the last column.  Then fill in the   box (over the check elijah) for the number of hours it took.  Be sure to read the complete instructions on the previous page.

## 2018-10-16 ENCOUNTER — DATING (OUTPATIENT)
Dept: OBGYN | Facility: CLINIC | Age: 23
End: 2018-10-16

## 2018-10-16 ENCOUNTER — HOSPITAL ENCOUNTER (OUTPATIENT)
Dept: RADIOLOGY | Facility: MEDICAL CENTER | Age: 23
End: 2018-10-16
Attending: OBSTETRICS & GYNECOLOGY
Payer: COMMERCIAL

## 2018-10-16 DIAGNOSIS — O09.892 SUPERVISION OF OTHER HIGH RISK PREGNANCIES, SECOND TRIMESTER: ICD-10-CM

## 2018-10-16 DIAGNOSIS — E89.0 POSTSURGICAL HYPOTHYROIDISM: ICD-10-CM

## 2018-10-16 PROCEDURE — 76816 OB US FOLLOW-UP PER FETUS: CPT

## 2018-10-18 ENCOUNTER — TELEPHONE (OUTPATIENT)
Dept: OBGYN | Facility: CLINIC | Age: 23
End: 2018-10-18

## 2018-10-18 NOTE — TELEPHONE ENCOUNTER
----- Message from Deidre Keenan M.D. sent at 10/16/2018  4:39 PM PDT -----  Please inform the patient her fetal ultrasound was normal, f/u as scheduled    Pt notified, not further questions.

## 2018-10-23 ENCOUNTER — ROUTINE PRENATAL (OUTPATIENT)
Dept: OBGYN | Facility: CLINIC | Age: 23
End: 2018-10-23
Payer: COMMERCIAL

## 2018-10-23 VITALS — DIASTOLIC BLOOD PRESSURE: 70 MMHG | WEIGHT: 180 LBS | BODY MASS INDEX: 30.9 KG/M2 | SYSTOLIC BLOOD PRESSURE: 110 MMHG

## 2018-10-23 DIAGNOSIS — E89.0 POSTSURGICAL HYPOTHYROIDISM: ICD-10-CM

## 2018-10-23 DIAGNOSIS — O09.893 SUPERVISION OF OTHER HIGH RISK PREGNANCIES, THIRD TRIMESTER: ICD-10-CM

## 2018-10-23 DIAGNOSIS — O34.219 HX OF CESAREAN SECTION COMPLICATING PREGNANCY: ICD-10-CM

## 2018-10-23 LAB
APPEARANCE UR: NORMAL
BILIRUB UR STRIP-MCNC: NORMAL MG/DL
COLOR UR AUTO: NORMAL
GLUCOSE UR STRIP.AUTO-MCNC: NORMAL MG/DL
KETONES UR STRIP.AUTO-MCNC: NORMAL MG/DL
LEUKOCYTE ESTERASE UR QL STRIP.AUTO: NORMAL
NITRITE UR QL STRIP.AUTO: NORMAL
PH UR STRIP.AUTO: 5.5 [PH] (ref 5–8)
PROT UR QL STRIP: NORMAL MG/DL
RBC UR QL AUTO: NORMAL
SP GR UR STRIP.AUTO: 1.02
UROBILINOGEN UR STRIP-MCNC: NORMAL MG/DL

## 2018-10-23 PROCEDURE — 81002 URINALYSIS NONAUTO W/O SCOPE: CPT | Performed by: OBSTETRICS & GYNECOLOGY

## 2018-10-23 PROCEDURE — 90040 PR PRENATAL FOLLOW UP: CPT | Performed by: OBSTETRICS & GYNECOLOGY

## 2018-10-23 NOTE — PROGRESS NOTES
Pt here today for OB follow up  Pt states started having blayne finn last night, and anxiety is really high.  Reports +FM  Pharmacy Confirmed.

## 2018-10-23 NOTE — PROGRESS NOTES
S: Pt presents for routine OB follow up. Good fetal movement.  Complains of anxiety and irregular blayne finn contractions, lower abdomen discomfort. Onset today  No f/c, no N/V/D.   No vaginal bleeding, or leakage of fluid.    Questions answered.    O: /70   Wt 81.6 kg (180 lb)   LMP 2018   BMI 30.90 kg/m²   Patients' weight gain, fluid intake and exercise level discussed.  Vitals, fundal height , fetal position, and FHR reviewed on flowsheet    Lab:No results found for this or any previous visit (from the past 336 hour(s)).    A/P:  23 y.o.  at 30w3d presents for routine obstetric follow-up.  Size equals dates and/or scan    1.  Continue prenatal vitamins.  2.  Fetal kick counts.  3.  Exercise at least 30 minutes daily.  4.  Increase water intake.  5.  Labor precautions educated.  6.  Follow-up in 2 weeks.  7.  Cervix closed, urine normal --> recommend warm bath, tylenol, fluids, rest --> if not improved go to L&D for evaluation

## 2018-11-06 ENCOUNTER — ROUTINE PRENATAL (OUTPATIENT)
Dept: OBGYN | Facility: CLINIC | Age: 23
End: 2018-11-06
Payer: COMMERCIAL

## 2018-11-06 VITALS — DIASTOLIC BLOOD PRESSURE: 68 MMHG | SYSTOLIC BLOOD PRESSURE: 110 MMHG | WEIGHT: 183 LBS | BODY MASS INDEX: 31.41 KG/M2

## 2018-11-06 DIAGNOSIS — E89.0 POSTSURGICAL HYPOTHYROIDISM: ICD-10-CM

## 2018-11-06 DIAGNOSIS — O09.893 SUPERVISION OF OTHER HIGH RISK PREGNANCIES, THIRD TRIMESTER: ICD-10-CM

## 2018-11-06 DIAGNOSIS — O34.219 HX OF CESAREAN SECTION COMPLICATING PREGNANCY: ICD-10-CM

## 2018-11-06 PROCEDURE — 90040 PR PRENATAL FOLLOW UP: CPT | Performed by: OBSTETRICS & GYNECOLOGY

## 2018-11-06 NOTE — PROGRESS NOTES
S: Pt presents for routine OB follow up. Good fetal movement.  No contractions, vaginal bleeding, or leakage of fluid.    Questions answered.    O: /68   Wt 83 kg (183 lb)   LMP 2018   BMI 31.41 kg/m²   Patients' weight gain, fluid intake and exercise level discussed.  Vitals, fundal height , fetal position, and FHR reviewed on flowsheet    Lab:  Recent Results (from the past 336 hour(s))   POCT Urinalysis    Collection Time: 10/23/18  2:19 PM   Result Value Ref Range    POC Color  Negative    POC Appearance  Negative    POC Leukocyte Esterase small Negative    POC Nitrites neg Negative    POC Urobiligen  Negative (0.2) mg/dL    POC Protein neg Negative mg/dL    POC Urine PH 5.5 5.0 - 8.0    POC Blood neg Negative    POC Specific Gravity 1.025 <1.005 - >1.030    POC Ketones neg Negative mg/dL    POC Bilirubin  Negative mg/dL    POC Glucose neg Negative mg/dL       A/P:  23 y.o.  at 32w3d presents for routine obstetric follow-up.  Size equals dates and/or scan  Prior  x 1 - desires TOLAC, counseled today    1.  Continue prenatal vitamins.  2.  Fetal kick counts.  3.  Exercise at least 30 minutes daily.  4.  Increase water intake.  5.  Labor precautions educated.  6.  Follow-up in 2 weeks.  7.  GBS at 36 weeks    Discussed with patient appropriate candidacy for trial of labor after  delivery. Patient does have a previous  ×1, 2 layer closure. Patient is appropriate candidate for trial of labor if so desires. After discussion of risks and benefits associated with vaginal birth after  including risk of uterine rupture being one in 1000, also discussed with patient the possibility that if uterus ruptures may be impossible for us to deliver the baby without having fetal compromise. Also discussed the risks of repeat  delivery. Discussed office policy regarding  which is patient should go into spontaneous labor by 40 weeks otherwise repeat  will  be scheduled at 40 weeks. Pt understands and agrees, desires TOLAC.

## 2018-11-06 NOTE — PROGRESS NOTES
Pt here today for OB follow up  Pt Denies any leaking fluids or contractions  Reports +FM  Good # [phone]  Pharmacy Confirmed.

## 2018-11-28 ENCOUNTER — HOSPITAL ENCOUNTER (OUTPATIENT)
Facility: MEDICAL CENTER | Age: 23
End: 2018-11-28
Attending: OBSTETRICS & GYNECOLOGY
Payer: COMMERCIAL

## 2018-11-28 ENCOUNTER — ROUTINE PRENATAL (OUTPATIENT)
Dept: OBGYN | Facility: CLINIC | Age: 23
End: 2018-11-28
Payer: COMMERCIAL

## 2018-11-28 VITALS — DIASTOLIC BLOOD PRESSURE: 64 MMHG | BODY MASS INDEX: 31.76 KG/M2 | WEIGHT: 185 LBS | SYSTOLIC BLOOD PRESSURE: 118 MMHG

## 2018-11-28 DIAGNOSIS — O09.893 SUPERVISION OF OTHER HIGH RISK PREGNANCIES, THIRD TRIMESTER: ICD-10-CM

## 2018-11-28 DIAGNOSIS — O34.219 HX OF CESAREAN SECTION COMPLICATING PREGNANCY: ICD-10-CM

## 2018-11-28 DIAGNOSIS — E89.0 POSTSURGICAL HYPOTHYROIDISM: ICD-10-CM

## 2018-11-28 PROCEDURE — 90040 PR PRENATAL FOLLOW UP: CPT | Performed by: OBSTETRICS & GYNECOLOGY

## 2018-11-28 PROCEDURE — 87081 CULTURE SCREEN ONLY: CPT

## 2018-11-28 PROCEDURE — 87150 DNA/RNA AMPLIFIED PROBE: CPT

## 2018-11-28 NOTE — PROGRESS NOTES
S: Pt presents for routine OB follow up. Good fetal movement.  Irregular contractions but no vaginal bleeding or leakage of fluid.    Questions answered.    O: /64   Wt 83.9 kg (185 lb)   LMP 2018   BMI 31.76 kg/m²   Patients' weight gain, fluid intake and exercise level discussed.  Vitals, fundal height , fetal position, and FHR reviewed on flowsheet    Lab:No results found for this or any previous visit (from the past 336 hour(s)).    A/P:  23 y.o.  at 35w4d presents for routine obstetric follow-up.  Size equals dates and/or scan    1.  Continue prenatal vitamins.  2.  Fetal kick counts.  3.  Exercise at least 30 minutes daily.  4.  Increase water intake.  5.  Labor precautions educated.  6.  Follow-up in 1 weeks.  7.  GBS collected today

## 2018-11-28 NOTE — PROGRESS NOTES
Pt here today for OB follow up @ 35w4d  Pt denies leaking fluids or vaginal blood  Reports +  Good # [phone]  Pharmacy Confirmed.  GBS today

## 2018-11-29 LAB — GP B STREP DNA SPEC QL NAA+PROBE: NEGATIVE

## 2018-12-01 ENCOUNTER — HOSPITAL ENCOUNTER (OUTPATIENT)
Facility: MEDICAL CENTER | Age: 23
End: 2018-12-01
Attending: OBSTETRICS & GYNECOLOGY | Admitting: OBSTETRICS & GYNECOLOGY
Payer: COMMERCIAL

## 2018-12-01 VITALS
RESPIRATION RATE: 16 BRPM | HEART RATE: 83 BPM | TEMPERATURE: 98 F | DIASTOLIC BLOOD PRESSURE: 60 MMHG | HEIGHT: 64 IN | SYSTOLIC BLOOD PRESSURE: 110 MMHG | BODY MASS INDEX: 31.58 KG/M2 | WEIGHT: 185 LBS

## 2018-12-01 LAB
APPEARANCE UR: CLEAR
BACTERIA #/AREA URNS HPF: ABNORMAL /HPF
BILIRUB UR QL STRIP.AUTO: NEGATIVE
COLOR UR: YELLOW
EPI CELLS #/AREA URNS HPF: ABNORMAL /HPF
GLUCOSE UR STRIP.AUTO-MCNC: NEGATIVE MG/DL
HYALINE CASTS #/AREA URNS LPF: ABNORMAL /LPF
KETONES UR STRIP.AUTO-MCNC: NEGATIVE MG/DL
LEUKOCYTE ESTERASE UR QL STRIP.AUTO: ABNORMAL
MICRO URNS: ABNORMAL
NITRITE UR QL STRIP.AUTO: NEGATIVE
PH UR STRIP.AUTO: 7.5 [PH]
PROT UR QL STRIP: NEGATIVE MG/DL
RBC # URNS HPF: ABNORMAL /HPF
RBC UR QL AUTO: NEGATIVE
SP GR UR STRIP.AUTO: 1.01
UROBILINOGEN UR STRIP.AUTO-MCNC: 0.2 MG/DL
WBC #/AREA URNS HPF: ABNORMAL /HPF

## 2018-12-01 PROCEDURE — 87086 URINE CULTURE/COLONY COUNT: CPT

## 2018-12-01 PROCEDURE — 59025 FETAL NON-STRESS TEST: CPT

## 2018-12-01 PROCEDURE — 81001 URINALYSIS AUTO W/SCOPE: CPT

## 2018-12-01 NOTE — PROGRESS NOTES
1235 - Patient of Dr. Keenan presents with complaints of contractions. Reddy Gestation - 36.0 weeks    Reports contractions started today at 0900 am. Denies problems with Pregnancy - hx of emergent  at 30 wks for fetal intolerance and PP infection. Patient desires a TOLAC. Denies ROM or Bleeding. Denies change to vision/edema/HA, Reports FM. FM/TOCO placed. FOB and son at BS. POC discussed. Patient is working through contractions with increasing effort. SVE at 2-3/70/ - no blood or fluid noted on exam glove. Patient encouraged to call RN with all questions concerns needs prn.    1320 - Monitors off, patient ambulating. Plan to reassess in one hour.     1405 - Patient has returned from ambulating the halls. Monitors replaced. SVE reveals little change now 3/70/ - small amount of pink/red mucous noted, no fluid on the exam glove.     1425 - Report to Dr. Keenan regarding patient arrival/complaint/status. Order received for discharge home with labor precautions.     1500 - Reactive NST obtained s/p ambulating. Patient discharged home with specific instruction to return to L&D/Physician ie.. Bleeding/ROM/decreased FM/labor/concerns for self or baby. Patient/family deny questions/concerns regarding care since arrival to [facility]. Ambulating out of the hospital with family.

## 2018-12-03 ENCOUNTER — TELEPHONE (OUTPATIENT)
Dept: OBGYN | Facility: CLINIC | Age: 23
End: 2018-12-03

## 2018-12-03 LAB
BACTERIA UR CULT: NORMAL
SIGNIFICANT IND 70042: NORMAL
SITE SITE: NORMAL
SOURCE SOURCE: NORMAL

## 2018-12-03 NOTE — TELEPHONE ENCOUNTER
Pt called and stated she felt the baby drop lower into her stomach and wanted to know if that means she is going into labor. Pt informed that it is normal for baby to drop given gestational age. Labor precautions given. Pt verbalized understanding and had no other questions or concerns.

## 2018-12-05 ENCOUNTER — ROUTINE PRENATAL (OUTPATIENT)
Dept: OBGYN | Facility: CLINIC | Age: 23
End: 2018-12-05
Payer: COMMERCIAL

## 2018-12-05 VITALS — DIASTOLIC BLOOD PRESSURE: 66 MMHG | BODY MASS INDEX: 32.27 KG/M2 | SYSTOLIC BLOOD PRESSURE: 110 MMHG | WEIGHT: 188 LBS

## 2018-12-05 DIAGNOSIS — O09.893 SUPERVISION OF OTHER HIGH RISK PREGNANCIES, THIRD TRIMESTER: ICD-10-CM

## 2018-12-05 DIAGNOSIS — O34.219 HX OF CESAREAN SECTION COMPLICATING PREGNANCY: ICD-10-CM

## 2018-12-05 PROCEDURE — 90040 PR PRENATAL FOLLOW UP: CPT | Performed by: OBSTETRICS & GYNECOLOGY

## 2018-12-05 NOTE — PROGRESS NOTES
S: Pt presents for routine OB follow up. Good fetal movement.  No contractions, vaginal bleeding, or leakage of fluid.    Questions answered.    O: /66   Wt 85.3 kg (188 lb)   LMP 2018   BMI 32.27 kg/m²   Patients' weight gain, fluid intake and exercise level discussed.  Vitals, fundal height , fetal position, and FHR reviewed on flowsheet    Lab:  Recent Results (from the past 336 hour(s))   GRP B STREP, BY PCR (SEO BROTH)    Collection Time: 18 10:35 AM   Result Value Ref Range    Strep Gp B DNA PCR Negative Negative   URINALYSIS    Collection Time: 18 12:30 PM   Result Value Ref Range    Color Yellow     Character Clear     Specific Gravity 1.008 <1.035    Ph 7.5 5.0 - 8.0    Glucose Negative Negative mg/dL    Ketones Negative Negative mg/dL    Protein Negative Negative mg/dL    Bilirubin Negative Negative    Urobilinogen, Urine 0.2 Negative    Nitrite Negative Negative    Leukocyte Esterase Moderate (A) Negative    Occult Blood Negative Negative    Micro Urine Req Microscopic    URINE MICROSCOPIC (W/UA)    Collection Time: 18 12:30 PM   Result Value Ref Range    WBC 10-20 (A) /hpf    RBC 0-2 /hpf    Bacteria Few (A) None /hpf    Epithelial Cells Few /hpf    Hyaline Cast 0-2 /lpf   URINE CULTURE(NEW)    Collection Time: 18 12:30 PM   Result Value Ref Range    Significant Indicator NEG     Source UR     Site URINE, CLEAN CATCH     Urine Culture No growth at 48 hours        A/P:  23 y.o.  at 36w4d presents for routine obstetric follow-up.  Size equals dates and/or scan    1.  Continue prenatal vitamins.  2.  Fetal kick counts.  3.  Exercise at least 30 minutes daily.  4.  Drink at least 2L of water daily  5.  Labor precautions educated.  6.  Follow-up in 1 weeks.  7.  GBS neg

## 2018-12-05 NOTE — PROGRESS NOTES
Ob follow up. Good fetal movement. Pt has no complaints.   Phone # & pharmacy verified.  GBS= Negative

## 2018-12-12 ENCOUNTER — ROUTINE PRENATAL (OUTPATIENT)
Dept: OBGYN | Facility: CLINIC | Age: 23
End: 2018-12-12
Payer: COMMERCIAL

## 2018-12-12 VITALS — DIASTOLIC BLOOD PRESSURE: 68 MMHG | WEIGHT: 189 LBS | SYSTOLIC BLOOD PRESSURE: 110 MMHG | BODY MASS INDEX: 32.44 KG/M2

## 2018-12-12 DIAGNOSIS — O34.219 HX OF CESAREAN SECTION COMPLICATING PREGNANCY: ICD-10-CM

## 2018-12-12 DIAGNOSIS — O09.893 SUPERVISION OF OTHER HIGH RISK PREGNANCIES, THIRD TRIMESTER: ICD-10-CM

## 2018-12-12 DIAGNOSIS — E89.0 POSTSURGICAL HYPOTHYROIDISM: ICD-10-CM

## 2018-12-12 PROCEDURE — 90040 PR PRENATAL FOLLOW UP: CPT | Performed by: OBSTETRICS & GYNECOLOGY

## 2018-12-12 NOTE — PROGRESS NOTES
DEBBI:  37w4d    Pt reports doing well.  Denies vaginal bleeding, contractions, LOF.  Reports +FM.    /68   Wt 85.7 kg (189 lb)   LMP 2018   BMI 32.44 kg/m²   gen: AAO, NAD  FHTs: 140  FH: 36    A/P: 23 y.o.  @ 37w4d  #Prenatal care. PNL up to date, Rh+, glucola/3rd tri CBC.  RPR WNL; s/p tdap and flu vaccines  # FWB.  anatomy US wnl, FH appropriate, FHT + today, continue kick counts  # Labor precautions discussed  #Delivery plan: await spontaneous labor  #Postpartum planning.  plans to BF, PPBCM OCPs  # Unsure if wants epidural  #Plans for circ  #RTC 1wks

## 2018-12-12 NOTE — PROGRESS NOTES
Pt here today for OB follow up  Pt states no complaints  Reports +  Good # [phone]  Pharmacy Confirmed.  Chaperone offered and none needed.

## 2018-12-18 ENCOUNTER — APPOINTMENT (OUTPATIENT)
Dept: OBGYN | Facility: CLINIC | Age: 23
End: 2018-12-18
Payer: COMMERCIAL

## 2018-12-18 ENCOUNTER — HOSPITAL ENCOUNTER (INPATIENT)
Facility: MEDICAL CENTER | Age: 23
LOS: 1 days | End: 2018-12-19
Attending: OBSTETRICS & GYNECOLOGY | Admitting: OBSTETRICS & GYNECOLOGY
Payer: COMMERCIAL

## 2018-12-18 LAB
BASOPHILS # BLD AUTO: 0.2 % (ref 0–1.8)
BASOPHILS # BLD: 0.03 K/UL (ref 0–0.12)
EOSINOPHIL # BLD AUTO: 0.06 K/UL (ref 0–0.51)
EOSINOPHIL NFR BLD: 0.5 % (ref 0–6.9)
ERYTHROCYTE [DISTWIDTH] IN BLOOD BY AUTOMATED COUNT: 44.7 FL (ref 35.9–50)
ERYTHROCYTE [DISTWIDTH] IN BLOOD BY AUTOMATED COUNT: 45.5 FL (ref 35.9–50)
HCT VFR BLD AUTO: 35.9 % (ref 37–47)
HCT VFR BLD AUTO: 40 % (ref 37–47)
HGB BLD-MCNC: 12.4 G/DL (ref 12–16)
HGB BLD-MCNC: 14 G/DL (ref 12–16)
HOLDING TUBE BB 8507: NORMAL
IMM GRANULOCYTES # BLD AUTO: 0.07 K/UL (ref 0–0.11)
IMM GRANULOCYTES NFR BLD AUTO: 0.6 % (ref 0–0.9)
LYMPHOCYTES # BLD AUTO: 2.98 K/UL (ref 1–4.8)
LYMPHOCYTES NFR BLD: 23.5 % (ref 22–41)
MCH RBC QN AUTO: 32.7 PG (ref 27–33)
MCH RBC QN AUTO: 33.2 PG (ref 27–33)
MCHC RBC AUTO-ENTMCNC: 34.5 G/DL (ref 33.6–35)
MCHC RBC AUTO-ENTMCNC: 35 G/DL (ref 33.6–35)
MCV RBC AUTO: 94.7 FL (ref 81.4–97.8)
MCV RBC AUTO: 94.8 FL (ref 81.4–97.8)
MONOCYTES # BLD AUTO: 0.78 K/UL (ref 0–0.85)
MONOCYTES NFR BLD AUTO: 6.1 % (ref 0–13.4)
NEUTROPHILS # BLD AUTO: 8.78 K/UL (ref 2–7.15)
NEUTROPHILS NFR BLD: 69.1 % (ref 44–72)
NRBC # BLD AUTO: 0 K/UL
NRBC BLD-RTO: 0 /100 WBC
PLATELET # BLD AUTO: 222 K/UL (ref 164–446)
PLATELET # BLD AUTO: 260 K/UL (ref 164–446)
PMV BLD AUTO: 9.3 FL (ref 9–12.9)
PMV BLD AUTO: 9.5 FL (ref 9–12.9)
RBC # BLD AUTO: 3.79 M/UL (ref 4.2–5.4)
RBC # BLD AUTO: 4.22 M/UL (ref 4.2–5.4)
WBC # BLD AUTO: 12.7 K/UL (ref 4.8–10.8)
WBC # BLD AUTO: 20.2 K/UL (ref 4.8–10.8)

## 2018-12-18 PROCEDURE — 303615 HCHG EPIDURAL/SPINAL ANESTHESIA FOR LABOR

## 2018-12-18 PROCEDURE — 0KQM0ZZ REPAIR PERINEUM MUSCLE, OPEN APPROACH: ICD-10-PCS | Performed by: OBSTETRICS & GYNECOLOGY

## 2018-12-18 PROCEDURE — 700102 HCHG RX REV CODE 250 W/ 637 OVERRIDE(OP): Performed by: OBSTETRICS & GYNECOLOGY

## 2018-12-18 PROCEDURE — A9270 NON-COVERED ITEM OR SERVICE: HCPCS | Performed by: OBSTETRICS & GYNECOLOGY

## 2018-12-18 PROCEDURE — 36415 COLL VENOUS BLD VENIPUNCTURE: CPT

## 2018-12-18 PROCEDURE — 304965 HCHG RECOVERY SERVICES

## 2018-12-18 PROCEDURE — 85025 COMPLETE CBC W/AUTO DIFF WBC: CPT

## 2018-12-18 PROCEDURE — 770002 HCHG ROOM/CARE - OB PRIVATE (112)

## 2018-12-18 PROCEDURE — 700111 HCHG RX REV CODE 636 W/ 250 OVERRIDE (IP): Performed by: OBSTETRICS & GYNECOLOGY

## 2018-12-18 PROCEDURE — 59400 OBSTETRICAL CARE: CPT | Performed by: OBSTETRICS & GYNECOLOGY

## 2018-12-18 PROCEDURE — 700111 HCHG RX REV CODE 636 W/ 250 OVERRIDE (IP)

## 2018-12-18 PROCEDURE — 59409 OBSTETRICAL CARE: CPT

## 2018-12-18 PROCEDURE — 700105 HCHG RX REV CODE 258: Performed by: OBSTETRICS & GYNECOLOGY

## 2018-12-18 PROCEDURE — 85027 COMPLETE CBC AUTOMATED: CPT

## 2018-12-18 RX ORDER — DEXTROSE, SODIUM CHLORIDE, SODIUM LACTATE, POTASSIUM CHLORIDE, AND CALCIUM CHLORIDE 5; .6; .31; .03; .02 G/100ML; G/100ML; G/100ML; G/100ML; G/100ML
INJECTION, SOLUTION INTRAVENOUS CONTINUOUS
Status: DISCONTINUED | OUTPATIENT
Start: 2018-12-18 | End: 2018-12-19 | Stop reason: HOSPADM

## 2018-12-18 RX ORDER — ROPIVACAINE HYDROCHLORIDE 2 MG/ML
INJECTION, SOLUTION EPIDURAL; INFILTRATION; PERINEURAL
Status: COMPLETED
Start: 2018-12-18 | End: 2018-12-18

## 2018-12-18 RX ORDER — MISOPROSTOL 200 UG/1
600 TABLET ORAL
Status: DISCONTINUED | OUTPATIENT
Start: 2018-12-18 | End: 2018-12-19 | Stop reason: HOSPADM

## 2018-12-18 RX ORDER — METHYLERGONOVINE MALEATE 0.2 MG/ML
INJECTION INTRAVENOUS
Status: COMPLETED
Start: 2018-12-18 | End: 2018-12-18

## 2018-12-18 RX ORDER — SODIUM CHLORIDE, SODIUM LACTATE, POTASSIUM CHLORIDE, AND CALCIUM CHLORIDE .6; .31; .03; .02 G/100ML; G/100ML; G/100ML; G/100ML
1000 INJECTION, SOLUTION INTRAVENOUS
Status: DISCONTINUED | OUTPATIENT
Start: 2018-12-18 | End: 2018-12-18 | Stop reason: HOSPADM

## 2018-12-18 RX ORDER — SODIUM CHLORIDE, SODIUM LACTATE, POTASSIUM CHLORIDE, CALCIUM CHLORIDE 600; 310; 30; 20 MG/100ML; MG/100ML; MG/100ML; MG/100ML
INJECTION, SOLUTION INTRAVENOUS PRN
Status: DISCONTINUED | OUTPATIENT
Start: 2018-12-18 | End: 2018-12-19 | Stop reason: HOSPADM

## 2018-12-18 RX ORDER — CARBOPROST TROMETHAMINE 250 UG/ML
250 INJECTION, SOLUTION INTRAMUSCULAR
Status: DISCONTINUED | OUTPATIENT
Start: 2018-12-18 | End: 2018-12-19 | Stop reason: HOSPADM

## 2018-12-18 RX ORDER — IBUPROFEN 600 MG/1
600 TABLET ORAL EVERY 6 HOURS PRN
Status: DISCONTINUED | OUTPATIENT
Start: 2018-12-18 | End: 2018-12-19 | Stop reason: HOSPADM

## 2018-12-18 RX ORDER — ROPIVACAINE HYDROCHLORIDE 2 MG/ML
INJECTION, SOLUTION EPIDURAL; INFILTRATION; PERINEURAL CONTINUOUS
Status: DISCONTINUED | OUTPATIENT
Start: 2018-12-18 | End: 2018-12-19 | Stop reason: HOSPADM

## 2018-12-18 RX ORDER — OXYCODONE HYDROCHLORIDE AND ACETAMINOPHEN 5; 325 MG/1; MG/1
1 TABLET ORAL EVERY 4 HOURS PRN
Status: DISCONTINUED | OUTPATIENT
Start: 2018-12-18 | End: 2018-12-19 | Stop reason: HOSPADM

## 2018-12-18 RX ORDER — LIDOCAINE HYDROCHLORIDE 10 MG/ML
INJECTION, SOLUTION INFILTRATION; PERINEURAL
Status: ACTIVE
Start: 2018-12-18 | End: 2018-12-19

## 2018-12-18 RX ORDER — BISACODYL 10 MG
10 SUPPOSITORY, RECTAL RECTAL PRN
Status: DISCONTINUED | OUTPATIENT
Start: 2018-12-18 | End: 2018-12-19 | Stop reason: HOSPADM

## 2018-12-18 RX ORDER — VITAMIN A ACETATE, BETA CAROTENE, ASCORBIC ACID, CHOLECALCIFEROL, .ALPHA.-TOCOPHEROL ACETATE, DL-, THIAMINE MONONITRATE, RIBOFLAVIN, NIACINAMIDE, PYRIDOXINE HYDROCHLORIDE, FOLIC ACID, CYANOCOBALAMIN, CALCIUM CARBONATE, FERROUS FUMARATE, ZINC OXIDE, CUPRIC OXIDE 3080; 12; 120; 400; 1; 1.84; 3; 20; 22; 920; 25; 200; 27; 10; 2 [IU]/1; UG/1; MG/1; [IU]/1; MG/1; MG/1; MG/1; MG/1; MG/1; [IU]/1; MG/1; MG/1; MG/1; MG/1; MG/1
1 TABLET, FILM COATED ORAL EVERY MORNING
Status: DISCONTINUED | OUTPATIENT
Start: 2018-12-19 | End: 2018-12-19 | Stop reason: HOSPADM

## 2018-12-18 RX ORDER — OXYCODONE AND ACETAMINOPHEN 10; 325 MG/1; MG/1
1 TABLET ORAL EVERY 4 HOURS PRN
Status: DISCONTINUED | OUTPATIENT
Start: 2018-12-18 | End: 2018-12-19 | Stop reason: HOSPADM

## 2018-12-18 RX ORDER — DOCUSATE SODIUM 100 MG/1
100 CAPSULE, LIQUID FILLED ORAL 2 TIMES DAILY PRN
Status: DISCONTINUED | OUTPATIENT
Start: 2018-12-18 | End: 2018-12-19 | Stop reason: HOSPADM

## 2018-12-18 RX ORDER — SODIUM CHLORIDE, SODIUM LACTATE, POTASSIUM CHLORIDE, CALCIUM CHLORIDE 600; 310; 30; 20 MG/100ML; MG/100ML; MG/100ML; MG/100ML
INJECTION, SOLUTION INTRAVENOUS CONTINUOUS
Status: DISPENSED | OUTPATIENT
Start: 2018-12-18 | End: 2018-12-18

## 2018-12-18 RX ORDER — METHYLERGONOVINE MALEATE 0.2 MG/ML
0.2 INJECTION INTRAVENOUS
Status: DISCONTINUED | OUTPATIENT
Start: 2018-12-18 | End: 2018-12-19 | Stop reason: HOSPADM

## 2018-12-18 RX ORDER — SODIUM CHLORIDE, SODIUM LACTATE, POTASSIUM CHLORIDE, AND CALCIUM CHLORIDE .6; .31; .03; .02 G/100ML; G/100ML; G/100ML; G/100ML
250 INJECTION, SOLUTION INTRAVENOUS PRN
Status: DISCONTINUED | OUTPATIENT
Start: 2018-12-18 | End: 2018-12-18 | Stop reason: HOSPADM

## 2018-12-18 RX ADMIN — METHYLERGONOVINE MALEATE 0.2 MG: 0.2 INJECTION, SOLUTION INTRAMUSCULAR; INTRAVENOUS at 14:29

## 2018-12-18 RX ADMIN — IBUPROFEN 600 MG: 600 TABLET, FILM COATED ORAL at 16:26

## 2018-12-18 RX ADMIN — ROPIVACAINE HYDROCHLORIDE 100 ML: 2 INJECTION, SOLUTION EPIDURAL; INFILTRATION at 05:36

## 2018-12-18 RX ADMIN — IBUPROFEN 600 MG: 600 TABLET, FILM COATED ORAL at 22:57

## 2018-12-18 RX ADMIN — Medication 125 ML/HR: at 16:26

## 2018-12-18 RX ADMIN — FENTANYL CITRATE 100 MCG: 50 INJECTION, SOLUTION INTRAMUSCULAR; INTRAVENOUS at 04:53

## 2018-12-18 RX ADMIN — SODIUM CHLORIDE, POTASSIUM CHLORIDE, SODIUM LACTATE AND CALCIUM CHLORIDE: 600; 310; 30; 20 INJECTION, SOLUTION INTRAVENOUS at 05:21

## 2018-12-18 RX ADMIN — ROPIVACAINE HYDROCHLORIDE 100 ML: 2 INJECTION, SOLUTION EPIDURAL; INFILTRATION; PERINEURAL at 05:36

## 2018-12-18 RX ADMIN — SODIUM CHLORIDE, POTASSIUM CHLORIDE, SODIUM LACTATE AND CALCIUM CHLORIDE: 600; 310; 30; 20 INJECTION, SOLUTION INTRAVENOUS at 04:54

## 2018-12-18 ASSESSMENT — EDINBURGH POSTNATAL DEPRESSION SCALE (EPDS)
THE THOUGHT OF HARMING MYSELF HAS OCCURRED TO ME: SOMETIMES
I HAVE BEEN SO UNHAPPY THAT I HAVE HAD DIFFICULTY SLEEPING: YES, SOMETIMES
I HAVE BEEN SO UNHAPPY THAT I HAVE BEEN CRYING: YES, QUITE OFTEN
I HAVE BEEN ANXIOUS OR WORRIED FOR NO GOOD REASON: YES, VERY OFTEN
I HAVE LOOKED FORWARD WITH ENJOYMENT TO THINGS: RATHER LESS THAN I USED TO
I HAVE BLAMED MYSELF UNNECESSARILY WHEN THINGS WENT WRONG: YES, MOST OF THE TIME
I HAVE BEEN ABLE TO LAUGH AND SEE THE FUNNY SIDE OF THINGS: NOT QUITE SO MUCH NOW
THINGS HAVE BEEN GETTING ON TOP OF ME: YES, SOMETIMES I HAVEN'T BEEN COPING AS WELL AS USUAL
I HAVE FELT SCARED OR PANICKY FOR NO GOOD REASON: YES, QUITE A LOT
I HAVE FELT SAD OR MISERABLE: YES, QUITE OFTEN

## 2018-12-18 ASSESSMENT — PATIENT HEALTH QUESTIONNAIRE - PHQ9
2. FEELING DOWN, DEPRESSED, IRRITABLE, OR HOPELESS: NOT AT ALL
1. LITTLE INTEREST OR PLEASURE IN DOING THINGS: NOT AT ALL
SUM OF ALL RESPONSES TO PHQ9 QUESTIONS 1 AND 2: 0

## 2018-12-18 ASSESSMENT — PAIN SCALES - GENERAL
PAINLEVEL_OUTOF10: 1
PAINLEVEL_OUTOF10: 0
PAINLEVEL_OUTOF10: 4
PAINLEVEL_OUTOF10: 8
PAINLEVEL_OUTOF10: 1
PAINLEVEL_OUTOF10: 0

## 2018-12-18 ASSESSMENT — COPD QUESTIONNAIRES
IN THE PAST 12 MONTHS DO YOU DO LESS THAN YOU USED TO BECAUSE OF YOUR BREATHING PROBLEMS: DISAGREE/UNSURE
DO YOU EVER COUGH UP ANY MUCUS OR PHLEGM?: NO/ONLY WITH OCCASIONAL COLDS OR INFECTIONS
DURING THE PAST 4 WEEKS HOW MUCH DID YOU FEEL SHORT OF BREATH: NONE/LITTLE OF THE TIME
COPD SCREENING SCORE: 0
HAVE YOU SMOKED AT LEAST 100 CIGARETTES IN YOUR ENTIRE LIFE: NO/DON'T KNOW

## 2018-12-18 ASSESSMENT — LIFESTYLE VARIABLES
ALCOHOL_USE: NO
EVER_SMOKED: YES
PACK_YEARS: 3

## 2018-12-18 NOTE — PROGRESS NOTES
Attending Physician Labor Progress Note:  Assuming care of Ms. Kendall who is a 23 y.o.  38w3d by LMP c/w 14w2d here for labor.  Pregnancy complicated by h/o CS w/ LTCS and double layer uterine closure with desire for TOLAC, hypothyroidism, and abnormal 1hr with normal 3 hour.  Last US at 29w4d, EFW 1388g, anterior placenta.    S: Pt reports she is doing well.  Feeling contractions but is mosly comfortable with epidural in place,  Continues to have LOF, no VB, +FM.    Vitals:    18 0626 18 0700 18 0730 18 0745   BP: 104/51 (!) 97/49 147/57 (!) 94/51   Pulse: 91 87 82 83   Temp:  36.4 °C (97.5 °F)     TempSrc:  Temporal     SpO2:       Weight:       Height:           Gen: NAD  Pulm: nonlabored breathing on RA  SVE: 9.5/+1 ( per nurse check)  Ext: no edema    FHT: 130/mod variability/+ accels/no decels  toco:  ctx q 3-5      A/P: 23 y.o.  @ 38w3d by LMP c/w 14w2d US undergoing TOLAC  #labor: ant lip - will recheck in about 2 hours or if clinical indication.  Anticipate .    #FHT: cat 1  #Pain management: epidural  #GBS: neg  #Rh +, RI  #Postpartum planning.  Plans to breastfeed.  Contraception:not addressed    DO Ranjan [facility], Women[facility] Health

## 2018-12-18 NOTE — CARE PLAN
Problem: Pain  Goal: Alleviation of Pain or a reduction in pain to the patient's comfort goal    Intervention: Pain Management - Epidural/Spinal  Discussed pain management options with pt. Pt requesting IV pain management and an epidural. See MAR.       Problem: Risk for Infection, Impaired Wound Healing  Goal: Remain free from signs and symptoms of infection    Intervention: Infection prevention measures  Discussed importance of hand hygiene with pt, FOB and family at bedside.

## 2018-12-18 NOTE — PROGRESS NOTES
0650: Report received from Katia RAINEY. POC discussed with Pt and FOB, and questions answered. Pt is comfortable with an epidural but does report pressure with contractions. 0911: Practice pushing with Pt. 0930: Call to Dr. Castellon. Pt is not pushing well at this time and does not feel any urge to push. Per Dr. Castellon it is ok to let Pt labor down. 1030: Pt reports feeling slightly more pressure. Per Dr. Castellon, ok to start pushing with Pt. 1048: Pt pushing 1256: Dr Castellon called for delivery 1415:  of viable male, APGARS 8/9. KIWI vacuum used without pop offs. 1640: Pt taken to  via WC. Report given to Renea RAINEY.

## 2018-12-18 NOTE — PROGRESS NOTES
Delivery Note for Vaginal Delivery     Stage 1:  23 y.o. y/o  at 38w3d weeks admitted for labor. Her pregnancy has been complicated by h/o CS with desire for tolac, hypothyroidism, abnormal 1hr with normal 3hr. Her labor progressed spontaneously.  The patient was noted to be GBS negative.  Fetal monitoring during labor was overall category I.  Patient had an epidural for pain control.     Stage II:  She then pushed for just over 3 hours to deliver a viable, vigorous male infant on 18 at 1415 with vacuum assistance. Mother verbally consented to vacuum after discussion of risks, benefits and alternatives.  Indication for vacuum was maternal exhaustion and maternal request.  Infant position was MEGAN.  Vacuum was suctioned into green zone and used through only one contraction.  No pop offs occurred.  Shoulders were delivered easily.  The infant was placed immediately upon mother’s abdomen.  Apgars at 1 and 5 minutes were 8/9 and the infant has not yet been weighed.    Stage III: Attention was then turned to active management of the third stage. The placenta was delivered spontaneously with gentle manual traction on the umbilical cord with countertraction maintained suprapubically.  On inspection, the placenta was intact and had normal insertion.  Upon delivery of the placenta, good hemostasis was noted with fundal massage and a 40 unit bolus of pitocin in IV infusion was administered per protocol.     Laceration repair: The perineum was inspected following delivery. A 2nd degree laceration was repaired with 2-0 vicryl after obtaining the patient's verbal consent in the usual fashion with good hemostasis achieved.      Estimated blood loss for the above procedures was 400cc.     Mother and infant in stable condition, and family pleased with birth.     Samanta Castellon DO  [facility], Women's Health

## 2018-12-18 NOTE — PROGRESS NOTES
Report received from PITER Jolly Rn. POC discussed. Pt requesting epidural; bolus initiated (see mar).     Dr Barrera at bedside. Epidural placed at 0540.     SVE at 0550 at 9/90/0. Dr Ray at bedside. Pt counseled on Tolac. Pt signed consent. Desires a vaginal delivery.    Pt resting comfortable with epidural. Denies further needs at this time.      Pt called out at 0645 feeling the urge to push. SVE at Lip/+1.     Report given to MADDISON Yo RN.

## 2018-12-18 NOTE — PROGRESS NOTES
0356- Pt presented to labor unit with possible SROm at 0200, clear fluid. Abd soft and non tender to touch. States UC's started at 0330, Denies VB. EFM and TOCO applied. Pt confirms fetal movement. POC discussed. Pt verbalized understanding. FOB at bedside.    0400- Sterile spec done, pooling of clear fluids noted. SVE 6/90/-2, posterior.     0400- Dr. Ray notified of pt status. Orders received to admit to labor. POC discussed with pt. Pt verbalized understanding.     0420- Pt transferred to labor unit. SBAR endorsed to GABBI Albright RN.

## 2018-12-18 NOTE — H&P
History and Physical      Bernard Kendall is a 23 y.o. year old female  at 38w3d who presents for gush of clear fluid at 0200 and contractions at 330.     Subjective:   positive  For CTXS.   positive Feels pain   positive for LOF  negative for vaginal bleeding.   positive for fetal movement    ROS: Pertinent items are noted in HPI.    Past Medical History:   Diagnosis Date   • Unspecified disorder of thyroid      Past Surgical History:   Procedure Laterality Date   • THYROIDECTOMY TOTAL  2014    Performed by Nahun Angulo M.D. at SURGERY [facility] ORS   • PRIMARY C SECTION       OB History    Para Term  AB Living   2 1 0 1   1   SAB TAB Ectopic Molar Multiple Live Births             1      # Outcome Date GA Lbr Gonzalo/2nd Weight Sex Delivery Anes PTL Lv   2 Current            1  04/02/15 30w0d  1.531 kg (3 lb 6 oz)  CS-LTranv   JOSELUIS      Complications: Fetal Intolerance      Birth Comments: Non-reassuring fetal surveillance, emergency , wound infection postpartum        Social History     Social History   • Marital status:      Spouse name: N/A   • Number of children: N/A   • Years of education: N/A     Occupational History   • Not on file.     Social History Main Topics   • Smoking status: Former Smoker     Packs/day: 0.50     Years: 4.00     Types: Cigarettes   • Smokeless tobacco: Never Used   • Alcohol use No      Comment: 1 per week   • Drug use: No   • Sexual activity: Yes     Partners: Male     Other Topics Concern   • Not on file     Social History Narrative   • No narrative on file     Allergies: Patient has no known allergies.    Current Facility-Administered Medications:   •  LR infusion, , Intravenous, Continuous, Gillian Ray M.D., Last Rate: 125 mL/hr at 18 0521  •  D5LR infusion, , Intravenous, Continuous, Gillian Ray M.D.  •  fentaNYL (SUBLIMAZE) injection 50 mcg, 50 mcg, Intravenous, Q HOUR PRN, Gillian Ray M.D.  •  fentaNYL  "(SUBLIMAZE) injection 100 mcg, 100 mcg, Intravenous, Q HOUR PRN, Gillian Ray M.D., 100 mcg at 18 0408  •  oxytocin (PITOCIN) 20 UNITS/1000ML LR, , , , , Stopped at 18 0500  •  ROPIVACAINE HCL 2 MG/ML INJ SOLN, , , ,     Prenatal care with [facility] starting at 20 wks with following problems:  Patient Active Problem List    Diagnosis Date Noted   • Hx of  section complicating pregnancy - LTCS w/ 2 layer closure  for NRFS per Op Report - for TOLAC 2018   • Supervision of other high risk pregnancies, third trimester 2018   • Postsurgical hypothyroidism 2018               Objective:      Height 1.626 m (5' 4\"), weight 86.2 kg (190 lb), last menstrual period 2018, not currently breastfeeding.    General:   no acute distress   Skin:   normal   HEENT:  PERRLA   Lungs:   CTA bilateral   Heart:   S1, S2 normal, no murmur, click, rub or gallop, regular rate and rhythm, brisk carotid upstroke without bruits, peripheral pulses very brisk, chest is clear without rales or wheezing, no pedal edema, no JVD, no hepatosplenomegaly   Abdomen:   gravid, NT   EFW:  3400 grams   Pelvis:  adequate with gynecoid pelvis   FHT:  130s BPM, moderate variability,  Accels, mild variable decels   Uterine Size: S=D   Presentations: Cephalic   Cervix: Per nurse - grossly ruptured    Dilation: 6cm    Effacement: 90%    Station:  -2    Consistency: Soft    Position: Anterior     Lab Review  Lab:   Blood type: A     Recent Results (from the past 5880 hour(s))   PRENATAL PANEL 3+HIV+UACXI    Collection Time: 18  9:20 AM   Result Value Ref Range    Color Yellow     Character Clear     Specific Gravity 1.016 <1.035    Ph 8.0 5.0 - 8.0    Glucose Negative Negative mg/dL    Ketones Negative Negative mg/dL    Protein Negative Negative mg/dL    Bilirubin Negative Negative    Urobilinogen, Urine 1.0 Negative    Nitrite Negative Negative    Leukocyte Esterase Moderate (A) Negative    Occult Blood Negative " Negative    Micro Urine Req Microscopic     WBC 10.1 4.8 - 10.8 K/uL    RBC 4.33 4.20 - 5.40 M/uL    Hemoglobin 14.3 12.0 - 16.0 g/dL    Hematocrit 41.1 37.0 - 47.0 %    MCV 94.9 81.4 - 97.8 fL    MCH 33.0 27.0 - 33.0 pg    MCHC 34.8 33.6 - 35.0 g/dL    RDW 43.0 35.9 - 50.0 fL    Platelet Count 295 164 - 446 K/uL    MPV 10.0 9.0 - 12.9 fL    Neutrophils-Polys 69.00 44.00 - 72.00 %    Lymphocytes 23.00 22.00 - 41.00 %    Monocytes 6.50 0.00 - 13.40 %    Eosinophils 0.70 0.00 - 6.90 %    Basophils 0.50 0.00 - 1.80 %    Immature Granulocytes 0.30 0.00 - 0.90 %    Nucleated RBC 0.00 /100 WBC    Neutrophils (Absolute) 7.00 2.00 - 7.15 K/uL    Lymphs (Absolute) 2.33 1.00 - 4.80 K/uL    Monos (Absolute) 0.66 0.00 - 0.85 K/uL    Eos (Absolute) 0.07 0.00 - 0.51 K/uL    Baso (Absolute) 0.05 0.00 - 0.12 K/uL    Immature Granulocytes (abs) 0.03 0.00 - 0.11 K/uL    NRBC (Absolute) 0.00 K/uL    Rubella IgG Antibody 137.40 IU/mL    Syphilis, Treponemal Qual Non Reactive Non Reactive    Hepatitis B Surface Antigen Negative Negative   AFP TETRA    Collection Time: 07/12/18  9:20 AM   Result Value Ref Range    AFP Value -Eia 28 ng/mL    AFP MOM Value 0.97     Ue3 Value 0.79 ng/mL    Ue3 Mom 1.04     Patient's hCG, 2nd Trimester 53442 IU/L    hCG MoM, 2nd Trimester 0.68     Amarilys Value -Eia 183 pg/mL    Amarilys Mom Value 1.07     Interpretation Screen Neg     [age] RADHA [age]    Maternal Weight 163.0 lbs.     Gest. Age on Collection Date 15 wks, 4 days     Gestational Age Based On Other     Multiple Pregnancy Reddy     Race Other     Insulin Dependent Diabetes No     Smoking No     Family Hx NTD No     Family Hx of Aneuploidy No     Specimen See Note     EER Quad, Maternal Serum See Note    HIV AG/AB COMBO ASSAY SCREENING    Collection Time: 07/12/18  9:20 AM   Result Value Ref Range    HIV Ag/Ab Combo Assay Non Reactive Non Reactive   TSH    Collection Time: 07/12/18  9:20 AM   Result Value Ref Range    TSH 3.580 0.380 -  5.330 uIU/mL   FREE THYROXINE    Collection Time: 07/12/18  9:20 AM   Result Value Ref Range    Free T-4 0.88 0.53 - 1.43 ng/dL   OP PRENATAL PANEL-BLOOD BANK    Collection Time: 07/12/18  9:20 AM   Result Value Ref Range    Antibody Screen Scrn NEG     Rh Grouping Only POS     ABO Grouping Only A    URINE MICROSCOPIC (W/UA)    Collection Time: 07/12/18  9:20 AM   Result Value Ref Range    WBC 0-2 /hpf    RBC 0-2 /hpf    Bacteria Few (A) None /hpf    Epithelial Cells Few /hpf    Hyaline Cast 0-2 /lpf   CBC WITHOUT DIFFERENTIAL    Collection Time: 09/28/18  9:55 AM   Result Value Ref Range    WBC 13.4 (H) 4.8 - 10.8 K/uL    RBC 4.03 (L) 4.20 - 5.40 M/uL    Hemoglobin 13.0 12.0 - 16.0 g/dL    Hematocrit 39.5 37.0 - 47.0 %    MCV 98.0 (H) 81.4 - 97.8 fL    MCH 32.3 27.0 - 33.0 pg    MCHC 32.9 (L) 33.6 - 35.0 g/dL    RDW 46.3 35.9 - 50.0 fL    Platelet Count 292 164 - 446 K/uL    MPV 9.9 9.0 - 12.9 fL   GLUCOSE 1HR GESTATIONAL    Collection Time: 09/28/18  9:55 AM   Result Value Ref Range    Glucose, Post Dose 145 (H) 70 - 139 mg/dL   T.PALLIDUM AB EIA    Collection Time: 09/28/18  9:55 AM   Result Value Ref Range    Syphilis, Treponemal Qual Non Reactive Non Reactive   TSH    Collection Time: 09/28/18  9:55 AM   Result Value Ref Range    TSH 5.260 0.380 - 5.330 uIU/mL   FREE THYROXINE    Collection Time: 09/28/18  9:55 AM   Result Value Ref Range    Free T-4 0.65 0.53 - 1.43 ng/dL   GLUCOSE TOLERANCE 3 HOUR    Collection Time: 10/09/18  7:00 AM   Result Value Ref Range    Glucose, Fasting 81 65 - 99 mg/dL    Glucose 3 Hour 62 (L) 65 - 109 mg/dL    Glucose 1 Hour 173 65 - 199 mg/dL   POCT Urinalysis    Collection Time: 10/23/18  2:19 PM   Result Value Ref Range    POC Color  Negative    POC Appearance  Negative    POC Leukocyte Esterase small Negative    POC Nitrites neg Negative    POC Urobiligen  Negative (0.2) mg/dL    POC Protein neg Negative mg/dL    POC Urine PH 5.5 5.0 - 8.0    POC Blood neg Negative    POC  Specific Gravity 1.025 <1.005 - >1.030    POC Ketones neg Negative mg/dL    POC Bilirubin  Negative mg/dL    POC Glucose neg Negative mg/dL   GRP B STREP, BY PCR (SEO BROTH)    Collection Time: 11/28/18 10:35 AM   Result Value Ref Range    Strep Gp B DNA PCR Negative Negative   URINALYSIS    Collection Time: 12/01/18 12:30 PM   Result Value Ref Range    Color Yellow     Character Clear     Specific Gravity 1.008 <1.035    Ph 7.5 5.0 - 8.0    Glucose Negative Negative mg/dL    Ketones Negative Negative mg/dL    Protein Negative Negative mg/dL    Bilirubin Negative Negative    Urobilinogen, Urine 0.2 Negative    Nitrite Negative Negative    Leukocyte Esterase Moderate (A) Negative    Occult Blood Negative Negative    Micro Urine Req Microscopic    URINE MICROSCOPIC (W/UA)    Collection Time: 12/01/18 12:30 PM   Result Value Ref Range    WBC 10-20 (A) /hpf    RBC 0-2 /hpf    Bacteria Few (A) None /hpf    Epithelial Cells Few /hpf    Hyaline Cast 0-2 /lpf   URINE CULTURE(NEW)    Collection Time: 12/01/18 12:30 PM   Result Value Ref Range    Significant Indicator NEG     Source UR     Site URINE, CLEAN CATCH     Urine Culture No growth at 48 hours    Hold Blood Bank Specimen (Not Tested)    Collection Time: 12/18/18  4:33 AM   Result Value Ref Range    Holding Tube - Bb DONE    CBC WITH DIFFERENTIAL    Collection Time: 12/18/18  4:33 AM   Result Value Ref Range    WBC 12.7 (H) 4.8 - 10.8 K/uL    RBC 4.22 4.20 - 5.40 M/uL    Hemoglobin 14.0 12.0 - 16.0 g/dL    Hematocrit 40.0 37.0 - 47.0 %    MCV 94.8 81.4 - 97.8 fL    MCH 33.2 (H) 27.0 - 33.0 pg    MCHC 35.0 33.6 - 35.0 g/dL    RDW 44.7 35.9 - 50.0 fL    Platelet Count 260 164 - 446 K/uL    MPV 9.3 9.0 - 12.9 fL    Neutrophils-Polys 69.10 44.00 - 72.00 %    Lymphocytes 23.50 22.00 - 41.00 %    Monocytes 6.10 0.00 - 13.40 %    Eosinophils 0.50 0.00 - 6.90 %    Basophils 0.20 0.00 - 1.80 %    Immature Granulocytes 0.60 0.00 - 0.90 %    Nucleated RBC 0.00 /100 WBC     Neutrophils (Absolute) 8.78 (H) 2.00 - 7.15 K/uL    Lymphs (Absolute) 2.98 1.00 - 4.80 K/uL    Monos (Absolute) 0.78 0.00 - 0.85 K/uL    Eos (Absolute) 0.06 0.00 - 0.51 K/uL    Baso (Absolute) 0.03 0.00 - 0.12 K/uL    Immature Granulocytes (abs) 0.07 0.00 - 0.11 K/uL    NRBC (Absolute) 0.00 K/uL        Assessment:   Bernard Kendall at 38w3d  Labor status: SROM, active labor  Hx of previous LTCS - desires TOLAC.  Patient has a history of previous  delivery and is desiring trial of labor after . Discussed with patient today are the risks of trial of labor after  which include uterine rupture risk of one in 1000. Discussed with patient in the event of uterine rupture, immediate emergency  delivery will be performed. There is no guarantee that  can be performed an adequate amount of time to prevent fetal brain damage or death. Also discussed with patient increase risks of hysterectomy and blood transfusion associated with failed vaginal birth after . Patient understands risks as described and agrees to continue at this time.      Obstetrical history significant for   Patient Active Problem List    Diagnosis Date Noted   • Hx of  section complicating pregnancy - LTCS w/ 2 layer closure  for NRFS per Op Report - for TOLAC 2018   • Supervision of other high risk pregnancies, third trimester 2018   • Postsurgical hypothyroidism 2018   .      Plan:     Admit to L&D  GBS negative  Epidural for anesthesia  Anticipate

## 2018-12-19 VITALS
TEMPERATURE: 97.8 F | HEART RATE: 81 BPM | BODY MASS INDEX: 32.44 KG/M2 | SYSTOLIC BLOOD PRESSURE: 100 MMHG | WEIGHT: 190 LBS | RESPIRATION RATE: 20 BRPM | OXYGEN SATURATION: 95 % | HEIGHT: 64 IN | DIASTOLIC BLOOD PRESSURE: 62 MMHG

## 2018-12-19 PROCEDURE — 302151 K-PAD 14X20: Performed by: OBSTETRICS & GYNECOLOGY

## 2018-12-19 PROCEDURE — A9270 NON-COVERED ITEM OR SERVICE: HCPCS | Performed by: OBSTETRICS & GYNECOLOGY

## 2018-12-19 PROCEDURE — 700112 HCHG RX REV CODE 229: Performed by: OBSTETRICS & GYNECOLOGY

## 2018-12-19 PROCEDURE — 302131 K PAD MOTOR: Performed by: OBSTETRICS & GYNECOLOGY

## 2018-12-19 PROCEDURE — 700102 HCHG RX REV CODE 250 W/ 637 OVERRIDE(OP): Performed by: OBSTETRICS & GYNECOLOGY

## 2018-12-19 RX ORDER — PSEUDOEPHEDRINE HCL 30 MG
100 TABLET ORAL 2 TIMES DAILY PRN
Qty: 60 CAP | Refills: 0 | Status: SHIPPED | OUTPATIENT
Start: 2018-12-19

## 2018-12-19 RX ORDER — IBUPROFEN 600 MG/1
600 TABLET ORAL EVERY 6 HOURS PRN
Qty: 30 TAB | Refills: 0 | Status: SHIPPED | OUTPATIENT
Start: 2018-12-19 | End: 2023-10-17

## 2018-12-19 RX ADMIN — IBUPROFEN 600 MG: 600 TABLET, FILM COATED ORAL at 05:02

## 2018-12-19 RX ADMIN — IBUPROFEN 600 MG: 600 TABLET, FILM COATED ORAL at 11:44

## 2018-12-19 RX ADMIN — OXYCODONE AND ACETAMINOPHEN 1 TABLET: 5; 325 TABLET ORAL at 14:49

## 2018-12-19 RX ADMIN — OXYCODONE AND ACETAMINOPHEN 1 TABLET: 5; 325 TABLET ORAL at 09:32

## 2018-12-19 RX ADMIN — OXYCODONE AND ACETAMINOPHEN 1 TABLET: 5; 325 TABLET ORAL at 05:02

## 2018-12-19 RX ADMIN — DOCUSATE SODIUM 100 MG: 100 CAPSULE, LIQUID FILLED ORAL at 05:02

## 2018-12-19 ASSESSMENT — PAIN SCALES - GENERAL
PAINLEVEL_OUTOF10: 5
PAINLEVEL_OUTOF10: 6
PAINLEVEL_OUTOF10: 5
PAINLEVEL_OUTOF10: 8
PAINLEVEL_OUTOF10: 7
PAINLEVEL_OUTOF10: 6

## 2018-12-19 ASSESSMENT — PATIENT HEALTH QUESTIONNAIRE - PHQ9
SUM OF ALL RESPONSES TO PHQ9 QUESTIONS 1 AND 2: 0
2. FEELING DOWN, DEPRESSED, IRRITABLE, OR HOPELESS: NOT AT ALL
1. LITTLE INTEREST OR PLEASURE IN DOING THINGS: NOT AT ALL

## 2018-12-19 NOTE — DISCHARGE PLANNING
"Discharge Planning Assessment Post Partum    Reason for Referral: MOB scored a 21 on the EPDS depression scale.  Address: [address] [place] [address]  Phone: [phone]  Type of Living Situation: living with FOB and son  Mom Diagnosis: Pregnancy  Baby Diagnosis: [place]  Primary Language: English    Father of the Baby: Jean Gutierrez  Involved in baby’s care? Yes  Contact Information: [phone]    Prenatal Care: Yes  Mom's PCP: Dr. Deidre Silva  PCP for new [facility]: [facility]    Support System: FOB and MOB's family  Coping/Bonding between mother & baby: Yes  Source of Feeding: breast  Supplies for Infant: prepared, denies any needs    Mom's Insurance: [employer]  Baby Covered on Insurance:Yes  Mother Employed/School: Yes, [employer] in [place]  Other children in the home/names & ages: 3 year old son, Nirmal Joseph (4/2/15)    Financial Hardship/Income: denies   Mom's Mental status: alert and oriented  Services used prior to admit: None    CPS History: denies  Psychiatric History: denies having a history of depression.  Discussed EPDS scale and MOB states she feels that her \"hormones are all over the place\".  She also stated she is exhausted and is ready to go home and sleep in her own bed.  Discussed depression and post partum depression.  Provided MOB with a list of counseling resources in [place] and a counseling resource specializing in post partum depression.  Recommended she contact her doctor if she experiences any signs or symptoms of depression.  Domestic Violence History: denies  Drug/ETOH History: denies    Resources Provided: children and family resource list, counseling resource for post partum depression, list of counselors in [place]  Referrals Made: diaper bank referral     Clearance for Discharge: Infant is cleared to discharge home with MOB.        "

## 2018-12-19 NOTE — CARE PLAN
Problem: Potential for postpartum infection related to presence of episiotomy/vaginal tear and/or uterine contamination  Goal: Patient will be absent from signs and symptoms of infection  Outcome: PROGRESSING AS EXPECTED  Patient does not exhibit any signs/symptoms of infection. Will continue to monitor with Q6 hour checks and patient rounding    Problem: Alteration in comfort related to episiotomy, vaginal repair and/or after birth pains  Goal: Patient is able to ambulate, care for self and infant  Outcome: PROGRESSING AS EXPECTED  Patient has demonstrated that she is able to ambulate and care for self and infant independently. Pain relief is met with PRN medication orders. Will continue to monitor with Q6 hour checks and patient rounding

## 2018-12-19 NOTE — PROGRESS NOTES
1150- Bedside report received from BRIGID Staton.  Patient denied needs.  1305- Dr. Sánchez notified of patient's EDPS score = 21 and discussed patient's response to question #10.  Per Dr. Keenan, she will talk to patient.

## 2018-12-19 NOTE — PROGRESS NOTES
1900: Bedside report received, assumed care of pt.     Assessment complete, VSS, fundus firm, lochia light. Pt voiding without difficulty. Bonding well with infant. Pt states pain is being well controlled and will call for PRN pain meds as needed, she is complaining of headache and pain in lower and upper back. Headache is constant and not positional. Medicated per MAR. POC discussed with pt and family, questions answered, call light within reach.

## 2018-12-19 NOTE — DISCHARGE SUMMARY
Discharge Summary:      Bernard Kendall      Admit Date:   2018  Discharge Date:  2018     Admitting diagnosis:  Pregnancy  Indication for care in labor or delivery  Discharge Diagnosis: Status post vacuum extraction.  Pregnancy Complications: Hx of C/S  Tubal Ligation:  no        History:  Past Medical History:   Diagnosis Date   • Unspecified disorder of thyroid      OB History    Para Term  AB Living   2 1 0 1   1   SAB TAB Ectopic Molar Multiple Live Births             1      # Outcome Date GA Lbr Gonzalo/2nd Weight Sex Delivery Anes PTL Lv   2 Current            1  04/02/15 30w0d  1.531 kg (3 lb 6 oz)  CS-LTranv   JOSELUIS      Complications: Fetal Intolerance      Birth Comments: Non-reassuring fetal surveillance, emergency , wound infection postpartum           Patient has no known allergies.  Patient Active Problem List    Diagnosis Date Noted   • Hx of  section complicating pregnancy - LTCS w/ 2 layer closure  for NRFS per Op Report - for TOLAC 2018   • Supervision of other high risk pregnancies, third trimester 2018   • Postsurgical hypothyroidism 2018        Hospital Course:   23 y.o. , now para 2, was admitted with the above mentioned diagnosis, underwent Active Labor, vacuum extraction. Patient postpartum course was unremarkable, with progressive advancement in diet , ambulation and toleration of oral analgesia. Patient without complaints today and desires discharge.      Vitals:    18 1100 18 1645 18 2000 18 0000   BP: 118/58 120/74 124/71 109/64   Pulse: 81 76 77 80   Resp:  16 16 16   Temp: 36.7 °C (98 °F) 36.6 °C (97.9 °F) 36.9 °C (98.4 °F) 37.1 °C (98.8 °F)   TempSrc: Temporal Temporal Temporal Temporal   SpO2:  96% 94% 93%   Weight:       Height:           Current Facility-Administered Medications   Medication Dose   • D5LR infusion     • oxytocin (PITOCIN) infusion (for postpartum)  2,000 mL/hr     Followed by   • oxytocin (PITOCIN) infusion (for postpartum)   mL/hr   • ibuprofen (MOTRIN) tablet 600 mg  600 mg   • oxyCODONE-acetaminophen (PERCOCET) 5-325 MG per tablet 1 Tab  1 Tab   • oxyCODONE-acetaminophen (PERCOCET-10)  MG per tablet 1 Tab  1 Tab   • ropivacaine (NAROPIN) injection     • LR infusion     • PRN oxytocin (PITOCIN) (20 Units/1000 mL) PRN for excessive uterine bleeding - See Admin Instr  125-999 mL/hr   • miSOPROStol (CYTOTEC) tablet 600 mcg  600 mcg   • methylergonovine (METHERGINE) injection 0.2 mg  0.2 mg   • carboPROST (HEMABATE) injection 250 mcg  250 mcg   • docusate sodium (COLACE) capsule 100 mg  100 mg   • bisacodyl (DULCOLAX) suppository 10 mg  10 mg   • magnesium hydroxide (MILK OF MAGNESIA) suspension 30 mL  30 mL   • prenatal plus vitamin (STUARTNATAL 1+1) 27-1 MG tablet 1 Tab  1 Tab       Exam:  Breast Exam: negative  Abdomen: Abdomen soft, non-tender. BS normal. No masses,  No organomegaly  Fundus Non Tender: yes  Incision: none  Perineum: 2nd degree tear  Extremity: extremities, peripheral pulses and reflexes normal     Labs:  Recent Labs      12/18/18   0433  12/18/18   2258   WBC  12.7*  20.2*   RBC  4.22  3.79*   HEMOGLOBIN  14.0  12.4   HEMATOCRIT  40.0  35.9*   MCV  94.8  94.7   MCH  33.2*  32.7   MCHC  35.0  34.5   RDW  44.7  45.5   PLATELETCT  260  222   MPV  9.3  9.5        Activity:   Discharge to home  Pelvic Rest x 6 weeks    Assessment:  normal postpartum course  Discharge Assessment: No areas of skin breakdown/redness; surgical incision intact/healing     Follow up: . [facility] in 5 weeks for vaginal  check.      Discharge Meds:   Current Outpatient Prescriptions   Medication Sig Dispense Refill   • ibuprofen (MOTRIN) 600 MG Tab Take 1 Tab by mouth every 6 hours as needed (For cramping after delivery; do not give if patient is receiving ketorolac (Toradol)). 30 Tab 0   • docusate sodium 100 MG Cap Take 100 mg by mouth 2 times a day as  needed for Constipation. 60 Cap 0       TASIA Doyle.

## 2018-12-19 NOTE — CARE PLAN
Problem: Altered physiologic condition related to immediate post-delivery state and potential for bleeding/hemorrhage  Goal: Patient physiologically stable as evidenced by normal lochia, palpable uterine involution and vital signs within normal limits  Outcome: PROGRESSING AS EXPECTED  Assessment done, fundus firm,light lochia. Fundal massage done.

## 2018-12-19 NOTE — PROGRESS NOTES
"Assumed care of pt.  Denies c/o pain at this time. Dr Keenan in with pt, states \"pt may go home and will send additional script for zoloft\"  Call light in place, encouraged to call with needs  "

## 2018-12-19 NOTE — LACTATION NOTE
This note was copied from a baby's chart.  Follow up visit:    MOB denies need for help. New Beginnings booklet given with brief discussion of skin to skin benefits and the breastfeeding section. Outpatient resources given through [facility] with 1:1 consultations by appointment and the Breastfeeding circles.     Breastfeeding POC:     Feeding on cue a minimum of 8x/24 hours with cluster feeding as normal.     Access outpatient resources as needed.

## 2018-12-19 NOTE — DISCHARGE INSTRUCTIONS
POSTPARTUM DISCHARGE INSTRUCTIONS FOR MOM    YOB: 1995   Age: 23 y.o.               Admit Date: 12/18/2018     Discharge Date: 12/19/2018  Attending Doctor:  Deidre Keenan M.D.                  Allergies:  Patient has no known allergies.    Discharged to home by car. Discharged via wheelchair, hospital escort: Yes.  Special equipment needed: Not Applicable  Belongings with: Personal  Be sure to schedule a follow-up appointment with your primary care doctor or any specialists as instructed.     Discharge Plan:   Diet Plan: Discussed  Activity Level: Discussed  Confirmed Follow up Appointment: Appointment Scheduled  Confirmed Symptoms Management: Discussed  Medication Reconciliation Updated: Yes  Influenza Vaccine Indication: Not indicated: Previously immunized this influenza season and > 8 years of age    REASONS TO CALL YOUR OBSTETRICIAN:  1.   Persistent fever or shaking chills (Temperature higher than 100.4)  2.   Heavy bleeding (soaking more than 1 pad per hour); Passing clots  3.   Foul odor from vagina  4.   Mastitis (Breast infection; breast pain, chills, fever, redness)  5.   Urinary pain, burning or frequency  6.   Episiotomy infection    8.   Severe depression longer than 24 hours    HAND WASHING  · Prior to handling the baby.  · Before breastfeeding or bottle feeding baby.  · After using the bathroom or changing the baby's diaper.        VAGINAL CARE  · Nothing inside vagina for 6 weeks: no sexual intercourse, tampons or douching.  · Bleeding may continue for 2-4 weeks.  Amount may vary.    · Call your physician for heavy bleeding which means soaking more than 1 pad per hour    BIRTH CONTROL  · It is possible to become pregnant at any time after delivery and while breastfeeding.  · Plan to discuss a method of birth control with your physician at your follow up visit. visit.    DIET AND ELIMINATION  · Eating more fiber (bran cereal, fruits, and vegetables) and drinking plenty of fluids will  "help to avoid constipation.  · Urinary frequency after childbirth is normal.    POSTPARTUM BLUES  During the first few days after birth, you may experience a sense of the \"blues\" which may include impatience, irritability or even crying.  These feeling come and go quickly.  However, as many as 1 in 10 women experience emotional symptoms known as postpartum depression.    Postpartum depression:  May start as early as the second or third day after delivery or take several weeks or months to develop.  Symptoms of \"blues\" are present, but are more intense:  Crying spells; loss of appetite; feelings of hopelessness or loss of control; fear of touching the baby; over concern or no concern at all about the baby; little or no concern about your own appearance/caring for yourself; and/or inability to sleep or excessive sleeping.  Contact your physician if you are experiencing any of these symptoms.    Crisis Hotline:  · [place] Crisis Hotline:  2-559-BDIUSRO  Or 1-433.561.9236  · [place] Crisis Hotline:  1-465.882.9177  Or 338-274-9437    PREVENTING SHAKEN BABY:  If you are angry or stressed, PUT THE BABY IN THE CRIB, step away, take some deep breaths, and wait until you are calm to care for the baby.  DO NOT SHAKE THE BABY.  You are not alone, call a supporter for help.    · [facility] Center 24/7 crisis line 169-640-8938 or 1-802.427.2182  · You can also text them, text \"ANSWER\" to 732645    QUIT SMOKING/TOBACCO USE:  I understand the use of any tobacco products increases my chance of suffering from future heart disease and could cause other illnesses which may shorten my life. Quitting the use of tobacco products is the single most important thing I can do to improve my health. For further information on smoking / tobacco cessation call a Toll Free Quit Line at 1-905.428.5062 (*[employer]) or 1-440.504.3890 (American Lung Association) or you can access the web based program at www.lungusa.org.    · [place] " Tobacco Users Help Line:  (908) 132-4695       Toll Free: 1-707.577.6357  · Quit Tobacco Program [facility] (884)191-2805    DEPRESSION / SUICIDE RISK:  As you are discharged from this [facility], it is important to learn how to keep safe from harming yourself.    Recognize the warning signs:  · Abrupt changes in personality, positive or negative- including increase in energy   · Giving away possessions  · Change in eating patterns- significant weight changes-  positive or negative  · Change in sleeping patterns- unable to sleep or sleeping all the time   · Unwillingness or inability to communicate  · Depression  · Unusual sadness, discouragement and loneliness  · Talk of wanting to die  · Neglect of personal appearance   · Rebelliousness- reckless behavior  · Withdrawal from people/activities they love  · Confusion- inability to concentrate     If you or a loved one observes any of these behaviors or has concerns about self-harm, here's what you can do:  · Talk about it- your feelings and reasons for harming yourself  · Remove any means that you might use to hurt yourself (examples: pills, rope, extension cords, firearm)  · Get professional help from the community (Mental Health, Substance Abuse, psychological counseling)  · Do not be alone:Call your Safe Contact- someone whom you trust who will be there for you.  · Call your local CRISIS HOTLINE 574-2282 or 661-517-4959  · Call your local Children's Mobile Crisis Response Team [place] (820) 528-5502 or www.[employer]  · Call the toll free National Suicide Prevention Hotlines   · National Suicide Prevention Lifeline 513-757-NIHZ (3519)  · National Hope Line Network 800-SUICIDE (212-0607)    DISCHARGE SURVEY:  Thank you for choosing [facility].  We hope we provided you with very good care.  You may be receiving a survey in the mail.  Please fill it out.  Your opinion is valuable to us.    ADDITIONAL EDUCATIONAL  MATERIALS GIVEN TO PATIENT:        My signature on this form indicates that:  1.  I have reviewed and understand the above information  2.  My questions regarding this information have been answered to my satisfaction.  3.  I have formulated a plan with my discharge nurse to obtain my prescribed medication for home.

## 2018-12-19 NOTE — PROGRESS NOTES
0700 - Bedside report received from BRIGID Johnson. Patient care assumed. Chart and orders reviewed  0800 - Pt assessment complete, wnl. Fundus firm with minimal discharge. Pt ambulating to bathroom and voiding without difficulty. Patient denies any dizziness or lightheadedness at this time. Patient denies any calf pain or tenderness at this time. Plan of care discussed with patient for the day including infant feeding every 2-3 hours or on demand, pain management, and ambulation in halls. Pain medication plan discussed with patient; patient states she will call if PRN pain medication is wanted. All questions/concerns addressed at this time. Call light within reach, encouraged to call with needs. Will continue with routine postpartum cares.

## 2019-01-28 ENCOUNTER — POST PARTUM (OUTPATIENT)
Dept: OBGYN | Facility: CLINIC | Age: 24
End: 2019-01-28
Payer: COMMERCIAL

## 2019-01-28 DIAGNOSIS — E03.9 HYPOTHYROIDISM, UNSPECIFIED TYPE: ICD-10-CM

## 2019-01-28 PROBLEM — O09.893 SUPERVISION OF OTHER HIGH RISK PREGNANCIES, THIRD TRIMESTER: Status: RESOLVED | Noted: 2018-07-13 | Resolved: 2019-01-28

## 2019-01-28 PROCEDURE — 90050 PR POSTPARTUM VISIT: CPT | Performed by: NURSE PRACTITIONER

## 2019-01-28 RX ORDER — ACETAMINOPHEN AND CODEINE PHOSPHATE 120; 12 MG/5ML; MG/5ML
1 SOLUTION ORAL DAILY
Qty: 28 TAB | Refills: 11 | Status: SHIPPED | OUTPATIENT
Start: 2019-01-28 | End: 2023-10-17

## 2019-01-28 NOTE — PROGRESS NOTES
Subjective:      Bernard Kendall is a 23 y.o. female who presents with No chief complaint on file.            HPI    ROS       Objective:     LMP 03/24/2018      Physical Exam   Constitutional: She appears well-nourished.   HENT:   Head: Normocephalic.   Eyes: Pupils are equal, round, and reactive to light.   Neck: Normal range of motion.   Thyroid surgically absent.    Cardiovascular: Normal rate and regular rhythm.    Pulmonary/Chest: Effort normal and breath sounds normal. No respiratory distress.   Abdominal: Soft. Bowel sounds are normal.   Genitourinary: Vagina normal and uterus normal. No vaginal discharge found.   Musculoskeletal: Normal range of motion.   Skin: Skin is warm and dry.   Psychiatric: She has a normal mood and affect. Her behavior is normal. Judgment and thought content normal.   Vitals reviewed.              Assessment/Plan:     1. Hypothyroidism, unspecified type  Continue 125 mcg synthroid.   - TSH+FREE T4

## 2019-01-28 NOTE — PROGRESS NOTES
Subjective   Subjective:    Bernard Kendall is a 23 y.o. female who presents for her postpartum exam s/p  with vacuum assist. Patient with second degree repaired laceration. She was admitted for active labor. Her prenatal course was the below problem list particularly postsurgical hypothyroidism. Her postpartum course was complicated by the below problem list as well. She denies dysuria, vaginal bleeding, odor, itching or breast problems. She states some perineal tenderness still with spotting irregularly. She states she is not having firm/hard bowel movements but are painful because she feels like something is splitting like a cut when passing stool. She is exclusively breastfeeding. She desires an OCP for her birth control method. Reports not having sex prior to this appointment. Eating a regular diet without difficulty. Patient denies postpartum depression but gets sad sometimes. States no need for referrals for mental health. Still taking 125 mcg synthroid with last thyroid labs in August. She ultimately plans on having [facility] in [place] follow her for primary care. She Works at [employer] and plans on continuing pumping during work and Dr. Keenan already gave her a return to work letter.     Problem List     Patient Active Problem List    Diagnosis Date Noted   • Postsurgical hypothyroidism 2018     Priority: High   • Hx of  section followed by  with vaccuum assist.  2018     Priority: Medium   • Postpartum examination following vaginal delivery 2018     Priority: Low       Objective    See PE  Lab: H&H upon discharge 12.4/35.9  Weight - 165 lb  Vitals - 110/60  Rectum normal exam  Slight perineal tenderness appreciated upon bimanual exam.   Thyroid surgically absent.     Assessment   Assessment:    1. PP care of lactating women   2. Exam WNL   3. Pap WNL on 3/17/18  4. Desires contraception - OCP compatible with breastfeeding.     Plan   Plan:    1.  Breastfeeding support   2. Continue PNV   3. Contraceptive counseling - micronor as compatible with breastfeeding to pharmacy with start up instructions.   4. Encouraged condom use for contraceptive start up and std protection if needed  5. Discussed diet, exercise and resumption of sexual activity   6. Preconception guidance for next pregnancy if applicable. Hypothyroidism and previous c/s as risk factors. Folic acid for all women of childbearing age.   7.  Follow up needed - Will order Tsh and Free thyroxine for thyroid management but also be sure to follow up with primary care.   8.  Smoking/etoh/drug screening - no exposure.   9.  Continue annual women's health.   10. Patient will try epsom salt baths for a week. Will try witchazel pads after BM, preparation H cream.

## 2019-01-28 NOTE — PROGRESS NOTES
Pt here for post partum appt. Pt states she wants birth control pills. Pt is currently breastfeeding without any problems  Wt:165lb  BP: 110/68

## 2019-02-06 ENCOUNTER — HOSPITAL ENCOUNTER (OUTPATIENT)
Dept: LAB | Facility: MEDICAL CENTER | Age: 24
End: 2019-02-06
Attending: NURSE PRACTITIONER
Payer: COMMERCIAL

## 2019-02-06 PROCEDURE — 84439 ASSAY OF FREE THYROXINE: CPT

## 2019-02-06 PROCEDURE — 84443 ASSAY THYROID STIM HORMONE: CPT

## 2019-02-06 PROCEDURE — 36415 COLL VENOUS BLD VENIPUNCTURE: CPT

## 2019-02-07 LAB
T4 FREE SERPL-MCNC: 0.98 NG/DL (ref 0.53–1.43)
TSH SERPL DL<=0.005 MIU/L-ACNC: 0.2 UIU/ML (ref 0.38–5.33)

## 2019-02-07 RX ORDER — LEVOTHYROXINE SODIUM 0.07 MG/1
75 TABLET ORAL
Qty: 30 TAB | Refills: 1 | Status: SHIPPED | OUTPATIENT
Start: 2019-02-07 | End: 2019-03-29 | Stop reason: SDUPTHER

## 2019-04-03 ENCOUNTER — TELEPHONE (OUTPATIENT)
Dept: OBGYN | Facility: CLINIC | Age: 24
End: 2019-04-03

## 2019-04-03 RX ORDER — LEVOTHYROXINE SODIUM 0.07 MG/1
TABLET ORAL
Qty: 30 TAB | Refills: 0 | Status: SHIPPED | OUTPATIENT
Start: 2019-04-03 | End: 2023-10-17

## 2019-04-03 NOTE — TELEPHONE ENCOUNTER
GRETA Alcala D.N.P. routed conversation to You 1 hour ago (8:20 AM)      Ysabel Alcala D.N.P. 1 hour ago (8:20 AM)         I have refilled one month but patient needs to get her thyroid labs updated as the dose may no longer be appropriate. Please have her get labs done. Thanks so much.          Documentation      Unable to contact pt, msg left to call back.

## 2019-04-03 NOTE — TELEPHONE ENCOUNTER
I have refilled one month but patient needs to get her thyroid labs updated as the dose may no longer be appropriate. Please have her get labs done. Thanks so much.

## 2020-10-09 ENCOUNTER — OFFICE VISIT (OUTPATIENT)
Dept: URGENT CARE | Facility: PHYSICIAN GROUP | Age: 25
End: 2020-10-09
Payer: MEDICAID

## 2020-10-09 ENCOUNTER — HOSPITAL ENCOUNTER (OUTPATIENT)
Dept: LAB | Facility: MEDICAL CENTER | Age: 25
End: 2020-10-09
Attending: PHYSICIAN ASSISTANT
Payer: MEDICAID

## 2020-10-09 VITALS
DIASTOLIC BLOOD PRESSURE: 82 MMHG | HEART RATE: 76 BPM | RESPIRATION RATE: 16 BRPM | BODY MASS INDEX: 32.44 KG/M2 | SYSTOLIC BLOOD PRESSURE: 114 MMHG | HEIGHT: 64 IN | OXYGEN SATURATION: 96 % | TEMPERATURE: 97.7 F | WEIGHT: 190 LBS

## 2020-10-09 DIAGNOSIS — R11.0 NAUSEA: ICD-10-CM

## 2020-10-09 DIAGNOSIS — R53.83 OTHER FATIGUE: ICD-10-CM

## 2020-10-09 DIAGNOSIS — E03.9 HYPOTHYROIDISM, UNSPECIFIED TYPE: ICD-10-CM

## 2020-10-09 DIAGNOSIS — R19.7 DIARRHEA, UNSPECIFIED TYPE: ICD-10-CM

## 2020-10-09 DIAGNOSIS — R42 DIZZY: ICD-10-CM

## 2020-10-09 LAB
APPEARANCE UR: CLEAR
BASOPHILS # BLD AUTO: 0.7 % (ref 0–1.8)
BASOPHILS # BLD: 0.04 K/UL (ref 0–0.12)
BILIRUB UR STRIP-MCNC: NORMAL MG/DL
COLOR UR AUTO: YELLOW
EOSINOPHIL # BLD AUTO: 0.08 K/UL (ref 0–0.51)
EOSINOPHIL NFR BLD: 1.3 % (ref 0–6.9)
ERYTHROCYTE [DISTWIDTH] IN BLOOD BY AUTOMATED COUNT: 42.9 FL (ref 35.9–50)
EST. AVERAGE GLUCOSE BLD GHB EST-MCNC: 108 MG/DL
GLUCOSE UR STRIP.AUTO-MCNC: NORMAL MG/DL
HBA1C MFR BLD: 5.4 % (ref 0–5.6)
HCT VFR BLD AUTO: 45.9 % (ref 37–47)
HGB BLD-MCNC: 15.3 G/DL (ref 12–16)
IMM GRANULOCYTES # BLD AUTO: 0.01 K/UL (ref 0–0.11)
IMM GRANULOCYTES NFR BLD AUTO: 0.2 % (ref 0–0.9)
INT CON NEG: NEGATIVE
INT CON POS: POSITIVE
KETONES UR STRIP.AUTO-MCNC: NORMAL MG/DL
LEUKOCYTE ESTERASE UR QL STRIP.AUTO: NORMAL
LYMPHOCYTES # BLD AUTO: 2.42 K/UL (ref 1–4.8)
LYMPHOCYTES NFR BLD: 39.6 % (ref 22–41)
MCH RBC QN AUTO: 31.4 PG (ref 27–33)
MCHC RBC AUTO-ENTMCNC: 33.3 G/DL (ref 33.6–35)
MCV RBC AUTO: 94.1 FL (ref 81.4–97.8)
MONOCYTES # BLD AUTO: 0.39 K/UL (ref 0–0.85)
MONOCYTES NFR BLD AUTO: 6.4 % (ref 0–13.4)
NEUTROPHILS # BLD AUTO: 3.17 K/UL (ref 2–7.15)
NEUTROPHILS NFR BLD: 51.8 % (ref 44–72)
NITRITE UR QL STRIP.AUTO: NORMAL
NRBC # BLD AUTO: 0 K/UL
NRBC BLD-RTO: 0 /100 WBC
PH UR STRIP.AUTO: 6 [PH] (ref 5–8)
PLATELET # BLD AUTO: 362 K/UL (ref 164–446)
PMV BLD AUTO: 9.6 FL (ref 9–12.9)
POC URINE PREGNANCY TEST: NEGATIVE
PROT UR QL STRIP: NORMAL MG/DL
RBC # BLD AUTO: 4.88 M/UL (ref 4.2–5.4)
RBC UR QL AUTO: NORMAL
SP GR UR STRIP.AUTO: 1.01
UROBILINOGEN UR STRIP-MCNC: NORMAL MG/DL
WBC # BLD AUTO: 6.1 K/UL (ref 4.8–10.8)

## 2020-10-09 PROCEDURE — 85025 COMPLETE CBC W/AUTO DIFF WBC: CPT

## 2020-10-09 PROCEDURE — 99204 OFFICE O/P NEW MOD 45 MIN: CPT | Mod: 25 | Performed by: PHYSICIAN ASSISTANT

## 2020-10-09 PROCEDURE — 84480 ASSAY TRIIODOTHYRONINE (T3): CPT

## 2020-10-09 PROCEDURE — 84443 ASSAY THYROID STIM HORMONE: CPT

## 2020-10-09 PROCEDURE — 36415 COLL VENOUS BLD VENIPUNCTURE: CPT

## 2020-10-09 PROCEDURE — 80053 COMPREHEN METABOLIC PANEL: CPT

## 2020-10-09 PROCEDURE — 84439 ASSAY OF FREE THYROXINE: CPT

## 2020-10-09 PROCEDURE — 83036 HEMOGLOBIN GLYCOSYLATED A1C: CPT

## 2020-10-09 PROCEDURE — 81025 URINE PREGNANCY TEST: CPT | Performed by: PHYSICIAN ASSISTANT

## 2020-10-09 PROCEDURE — 81002 URINALYSIS NONAUTO W/O SCOPE: CPT | Performed by: PHYSICIAN ASSISTANT

## 2020-10-09 RX ORDER — LEVOTHYROXINE SODIUM 88 UG/1
88 TABLET ORAL
COMMUNITY

## 2020-10-09 NOTE — PROGRESS NOTES
Chief Complaint   Patient presents with   • Diarrhea   • Dizziness       HISTORY OF PRESENT ILLNESS: Patient is a 25 y.o. female who presents today because 2-month history of dizziness, nausea without vomiting, fatigue, diarrhea, generalized feeling poorly.  She does have a history of a thyroidectomy and is on thyroid medication but not followed very adequately.  She has not been taking any medications for her current symptoms    Patient Active Problem List    Diagnosis Date Noted   • Postsurgical hypothyroidism 2018     Priority: High   • Hx of  section followed by  with vaccuum assist.  2018     Priority: Medium   • Postpartum examination following vaginal delivery 2018     Priority: Low       Allergies:Patient has no known allergies.    Current Outpatient Medications Ordered in Epic   Medication Sig Dispense Refill   • levothyroxine (SYNTHROID) 88 MCG Tab Take 88 mcg by mouth Every morning on an empty stomach.     • levothyroxine (SYNTHROID) 75 MCG Tab TAKE 1 TABLET BY MOUTH IN THE MORNING ON AN EMPTY STOMACH 30 Tab 0   • norethindrone (MICRONOR) 0.35 MG tablet Take 1 Tab by mouth every day. (Patient not taking: Reported on 10/9/2020) 28 Tab 11   • ibuprofen (MOTRIN) 600 MG Tab Take 1 Tab by mouth every 6 hours as needed (For cramping after delivery; do not give if patient is receiving ketorolac (Toradol)). (Patient not taking: Reported on 10/9/2020) 30 Tab 0   • docusate sodium 100 MG Cap Take 100 mg by mouth 2 times a day as needed for Constipation. 60 Cap 0   • sertraline (ZOLOFT) 50 MG Tab Take 1 Tab by mouth every day. (Patient not taking: Reported on 10/9/2020) 30 Tab 11   • famotidine (PEPCID) 20 MG Tab Take 1 Tab by mouth 2 times a day. (Patient not taking: Reported on 10/9/2020) 60 Tab 5   • levothyroxine (SYNTHROID) 125 MCG Tab Take 1 Tab by mouth Every morning on an empty stomach. 30 Tab 11   • Prenatal Multivit-Min-Fe-FA (PRE- PO) Take  by mouth.       No current  "Epic-ordered facility-administered medications on file.        Past Medical History:   Diagnosis Date   • Unspecified disorder of thyroid        Social History     Tobacco Use   • Smoking status: Former Smoker     Packs/day: 0.50     Years: 4.00     Pack years: 2.00     Types: Cigarettes   • Smokeless tobacco: Never Used   Substance Use Topics   • Alcohol use: No     Comment: 1 per week   • Drug use: No       No family status information on file.   History reviewed. No pertinent family history.    ROS:  Review of Systems   Constitutional: Negative for fever, chills, weight loss and positive for malaise/fatigue.   HENT: Negative for ear pain, nosebleeds, congestion, sore throat and neck pain.    Eyes: Negative for blurred vision.   Respiratory: Negative for cough, sputum production, shortness of breath and wheezing.    Cardiovascular: Negative for chest pain, palpitations, orthopnea and leg swelling.   Gastrointestinal: Negative for heartburn, positive for nausea, without vomiting and abdominal pain.  Positive for diarrhea  Genitourinary: Negative for dysuria, urgency and frequency.     Exam:  /82 (BP Location: Left arm, Patient Position: Sitting, BP Cuff Size: Large adult)   Pulse 76   Temp 36.5 °C (97.7 °F) (Temporal)   Resp 16   Ht 1.626 m (5' 4\")   Wt 86.2 kg (190 lb)   SpO2 96%   General:  Well nourished, well developed female in NAD  Head:Normocephalic, atraumatic  Eyes: PERRLA, EOM within normal limits, no conjunctival injection, no scleral icterus, visual fields and acuity grossly intact.  Ears: Normal shape and symmetry, no tenderness, no discharge. External canals are without any significant edema or erythema. Tympanic membranes are without any inflammation, no effusion. Gross auditory acuity is intact  Nose: Symmetrical without tenderness, no discharge.  Mouth: reasonable hygiene, no erythema exudates or tonsillar enlargement.  Neck: no masses, range of motion within normal limits, no tracheal " deviation. No obvious thyroid enlargement.  Pulmonary: chest is symmetrical with respiration, no wheezes, crackles, or rhonchi.  Cardiovascular: regular rate and rhythm without murmurs, rubs, or gallops.  Extremities: no clubbing, cyanosis, or edema.    Urinalysis unremarkable, hCG negative    Please note that this dictation was created using voice recognition software. I have made every reasonable attempt to correct obvious errors, but I expect that there are errors of grammar and possibly content that I did not discover before finalizing the note.    Assessment/Plan:  1. Nausea  POCT Urinalysis    POCT Pregnancy    CBC WITH DIFFERENTIAL    Comp Metabolic Panel    HEMOGLOBIN A1C    TRIIDOTHYRONINE    TSH+FREE T4   2. Other fatigue  POCT Urinalysis    POCT Pregnancy    CBC WITH DIFFERENTIAL    Comp Metabolic Panel    HEMOGLOBIN A1C    TRIIDOTHYRONINE    TSH+FREE T4   3. Dizzy  CBC WITH DIFFERENTIAL    Comp Metabolic Panel    HEMOGLOBIN A1C    TRIIDOTHYRONINE    TSH+FREE T4   4. Diarrhea, unspecified type  CBC WITH DIFFERENTIAL    Comp Metabolic Panel    HEMOGLOBIN A1C    TRIIDOTHYRONINE    TSH+FREE T4   5. Hypothyroidism, unspecified type  CBC WITH DIFFERENTIAL    Comp Metabolic Panel    HEMOGLOBIN A1C    TRIIDOTHYRONINE    TSH+FREE T4       Followup with primary care in the next 7-10 days if not significantly improving, return to the urgent care or go to the emergency room sooner for any worsening of symptoms.

## 2020-10-10 LAB
ALBUMIN SERPL BCP-MCNC: 4.6 G/DL (ref 3.2–4.9)
ALBUMIN/GLOB SERPL: 1.5 G/DL
ALP SERPL-CCNC: 88 U/L (ref 30–99)
ALT SERPL-CCNC: 18 U/L (ref 2–50)
ANION GAP SERPL CALC-SCNC: 9 MMOL/L (ref 7–16)
AST SERPL-CCNC: 15 U/L (ref 12–45)
BILIRUB SERPL-MCNC: 0.3 MG/DL (ref 0.1–1.5)
BUN SERPL-MCNC: 8 MG/DL (ref 8–22)
CALCIUM SERPL-MCNC: 9.3 MG/DL (ref 8.5–10.5)
CHLORIDE SERPL-SCNC: 104 MMOL/L (ref 96–112)
CO2 SERPL-SCNC: 25 MMOL/L (ref 20–33)
CREAT SERPL-MCNC: 0.72 MG/DL (ref 0.5–1.4)
FASTING STATUS PATIENT QL REPORTED: NORMAL
GLOBULIN SER CALC-MCNC: 3.1 G/DL (ref 1.9–3.5)
GLUCOSE SERPL-MCNC: 85 MG/DL (ref 65–99)
POTASSIUM SERPL-SCNC: 4.1 MMOL/L (ref 3.6–5.5)
PROT SERPL-MCNC: 7.7 G/DL (ref 6–8.2)
SODIUM SERPL-SCNC: 138 MMOL/L (ref 135–145)
T3 SERPL-MCNC: 95.5 NG/DL (ref 60–181)
T4 FREE SERPL-MCNC: 1.23 NG/DL (ref 0.93–1.7)
TSH SERPL DL<=0.005 MIU/L-ACNC: 11.5 UIU/ML (ref 0.38–5.33)

## 2020-12-21 ENCOUNTER — NON-PROVIDER VISIT (OUTPATIENT)
Dept: URGENT CARE | Facility: PHYSICIAN GROUP | Age: 25
End: 2020-12-21

## 2020-12-21 DIAGNOSIS — Z02.89 ENCOUNTER FOR OCCUPATIONAL HEALTH ASSESSMENT: ICD-10-CM

## 2020-12-21 DIAGNOSIS — Z02.1 PRE-EMPLOYMENT DRUG SCREENING: ICD-10-CM

## 2020-12-21 LAB
AMP AMPHETAMINE: NORMAL
COC COCAINE: NORMAL
INT CON NEG: NORMAL
INT CON POS: NORMAL
MET METHAMPHETAMINES: NORMAL
OPI OPIATES: NORMAL
PCP PHENCYCLIDINE: NORMAL
POC DRUG COMMENT 753798-OCCUPATIONAL HEALTH: NEGATIVE
THC: NORMAL

## 2020-12-21 PROCEDURE — 80305 DRUG TEST PRSMV DIR OPT OBS: CPT | Performed by: PHYSICIAN ASSISTANT

## 2021-04-22 ENCOUNTER — OFFICE VISIT (OUTPATIENT)
Dept: URGENT CARE | Facility: PHYSICIAN GROUP | Age: 26
End: 2021-04-22
Payer: MEDICAID

## 2021-04-22 VITALS
TEMPERATURE: 99.7 F | SYSTOLIC BLOOD PRESSURE: 118 MMHG | OXYGEN SATURATION: 95 % | DIASTOLIC BLOOD PRESSURE: 68 MMHG | HEART RATE: 94 BPM | BODY MASS INDEX: 32.44 KG/M2 | HEIGHT: 64 IN | WEIGHT: 190 LBS

## 2021-04-22 DIAGNOSIS — K21.9 GASTROESOPHAGEAL REFLUX DISEASE, UNSPECIFIED WHETHER ESOPHAGITIS PRESENT: ICD-10-CM

## 2021-04-22 DIAGNOSIS — R11.0 NAUSEA: ICD-10-CM

## 2021-04-22 DIAGNOSIS — R19.7 DIARRHEA, UNSPECIFIED TYPE: ICD-10-CM

## 2021-04-22 LAB
APPEARANCE UR: CLEAR
BILIRUB UR STRIP-MCNC: NORMAL MG/DL
COLOR UR AUTO: YELLOW
GLUCOSE UR STRIP.AUTO-MCNC: NORMAL MG/DL
INT CON NEG: NEGATIVE
INT CON POS: POSITIVE
KETONES UR STRIP.AUTO-MCNC: NORMAL MG/DL
LEUKOCYTE ESTERASE UR QL STRIP.AUTO: NORMAL
NITRITE UR QL STRIP.AUTO: NORMAL
PH UR STRIP.AUTO: 6 [PH] (ref 5–8)
POC URINE PREGNANCY TEST: NORMAL
PROT UR QL STRIP: NORMAL MG/DL
RBC UR QL AUTO: NORMAL
SP GR UR STRIP.AUTO: 1.02
UROBILINOGEN UR STRIP-MCNC: 0.2 MG/DL

## 2021-04-22 PROCEDURE — 81025 URINE PREGNANCY TEST: CPT | Performed by: PHYSICIAN ASSISTANT

## 2021-04-22 PROCEDURE — 99214 OFFICE O/P EST MOD 30 MIN: CPT | Mod: 25 | Performed by: PHYSICIAN ASSISTANT

## 2021-04-22 PROCEDURE — 81002 URINALYSIS NONAUTO W/O SCOPE: CPT | Performed by: PHYSICIAN ASSISTANT

## 2021-04-22 RX ORDER — OMEPRAZOLE 20 MG/1
20 CAPSULE, DELAYED RELEASE ORAL DAILY
Qty: 30 CAPSULE | Refills: 0 | Status: SHIPPED | OUTPATIENT
Start: 2021-04-22 | End: 2023-10-17

## 2021-04-22 ASSESSMENT — FIBROSIS 4 INDEX: FIB4 SCORE: 0.25

## 2021-04-22 NOTE — PROGRESS NOTES
Chief Complaint   Patient presents with   • Nausea     couple days   • Diarrhea       HISTORY OF PRESENT ILLNESS: Patient is a 25 y.o. female who presents today because she has had some nausea and diarrhea recently.  She endorses midepigastric discomfort.  She does have a history of acid reflux and GERD and only takes Tums for her symptoms on occasion.  She states that a few days ago she had nausea, diarrhea, midepigastric pain that is common for her GERD symptoms as well as heartburn symptoms.  However she is feeling much better now but because she missed a few days needs a note in order to return to work    Patient Active Problem List    Diagnosis Date Noted   • Postsurgical hypothyroidism 2018   • Hx of  section followed by  with vaccuum assist.  2018   • Postpartum examination following vaginal delivery 2018       Allergies:Patient has no known allergies.    Current Outpatient Medications Ordered in Epic   Medication Sig Dispense Refill   • omeprazole (PRILOSEC) 20 MG delayed-release capsule Take 1 capsule by mouth every day. 30 capsule 0   • levothyroxine (SYNTHROID) 88 MCG Tab Take 88 mcg by mouth Every morning on an empty stomach.     • levothyroxine (SYNTHROID) 75 MCG Tab TAKE 1 TABLET BY MOUTH IN THE MORNING ON AN EMPTY STOMACH 30 Tab 0   • norethindrone (MICRONOR) 0.35 MG tablet Take 1 Tab by mouth every day. (Patient not taking: Reported on 10/9/2020) 28 Tab 11   • ibuprofen (MOTRIN) 600 MG Tab Take 1 Tab by mouth every 6 hours as needed (For cramping after delivery; do not give if patient is receiving ketorolac (Toradol)). (Patient not taking: Reported on 10/9/2020) 30 Tab 0   • docusate sodium 100 MG Cap Take 100 mg by mouth 2 times a day as needed for Constipation. 60 Cap 0   • sertraline (ZOLOFT) 50 MG Tab Take 1 Tab by mouth every day. (Patient not taking: Reported on 10/9/2020) 30 Tab 11   • famotidine (PEPCID) 20 MG Tab Take 1 Tab by mouth 2 times a day. (Patient not  "taking: Reported on 10/9/2020) 60 Tab 5   • levothyroxine (SYNTHROID) 125 MCG Tab Take 1 Tab by mouth Every morning on an empty stomach. 30 Tab 11   • Prenatal Multivit-Min-Fe-FA (PRE-MIGUEL PO) Take  by mouth.       No current Epic-ordered facility-administered medications on file.       Past Medical History:   Diagnosis Date   • Unspecified disorder of thyroid        Social History     Tobacco Use   • Smoking status: Former Smoker     Packs/day: 0.50     Years: 4.00     Pack years: 2.00     Types: Cigarettes   • Smokeless tobacco: Never Used   Substance Use Topics   • Alcohol use: No     Comment: 1 per week   • Drug use: No       No family status information on file.   History reviewed. No pertinent family history.    ROS:  Review of Systems   Constitutional: Negative for fever, chills, weight loss and malaise/fatigue.   HENT: Negative for ear pain, nosebleeds, congestion, sore throat and neck pain.    Eyes: Negative for blurred vision.   Respiratory: Negative for cough, sputum production, shortness of breath and wheezing.    Cardiovascular: Negative for chest pain, palpitations, orthopnea and leg swelling.   Gastrointestinal: Positive for heartburn, positive for diarrhea, nausea, no vomiting and positive for midepigastric abdominal pain.   Genitourinary: Negative for dysuria, urgency and frequency.     Exam:  /68 (BP Location: Right arm, Patient Position: Sitting, BP Cuff Size: Adult long)   Pulse 94   Temp 37.6 °C (99.7 °F) (Temporal)   Ht 1.626 m (5' 4\")   Wt 86.2 kg (190 lb)   SpO2 95%   General:  Well nourished, well developed female in NAD  Head:Normocephalic, atraumatic  Eyes: PERRLA, EOM within normal limits, no conjunctival injection, no scleral icterus, visual fields and acuity grossly intact.  Nose: Symmetrical without tenderness, no discharge.  Mouth: reasonable hygiene, no erythema exudates or tonsillar enlargement.  Neck: no masses, range of motion within normal limits, no tracheal " deviation. No obvious thyroid enlargement.  Extremities: no clubbing, cyanosis, or edema.    Urinalysis unremarkable, hCG negative    Please note that this dictation was created using voice recognition software. I have made every reasonable attempt to correct obvious errors, but I expect that there are errors of grammar and possibly content that I did not discover before finalizing the note.    Assessment/Plan:  1. Gastroesophageal reflux disease, unspecified whether esophagitis present  omeprazole (PRILOSEC) 20 MG delayed-release capsule   2. Nausea  POCT Urinalysis    POCT Pregnancy   3. Diarrhea, unspecified type         Followup with primary care in the next 7-10 days if not significantly improving, return to the urgent care or go to the emergency room sooner for any worsening of symptoms.

## 2021-04-22 NOTE — LETTER
April 22, 2021         Patient: Bernard Gutierrez   YOB: 1995   Date of Visit: 4/22/2021           To Whom it May Concern:    Bernard Gutierrez was seen in my clinic on 4/22/2021. She may return to work on 4/26/2021, please excuse any recent absences.    If you have any questions or concerns, please don't hesitate to call.        Sincerely,           David Auguste P.A.-C.  Electronically Signed

## 2023-09-16 ENCOUNTER — OFFICE VISIT (OUTPATIENT)
Dept: URGENT CARE | Facility: PHYSICIAN GROUP | Age: 28
End: 2023-09-16
Payer: MEDICAID

## 2023-09-16 VITALS
BODY MASS INDEX: 31.58 KG/M2 | DIASTOLIC BLOOD PRESSURE: 72 MMHG | TEMPERATURE: 97.7 F | RESPIRATION RATE: 16 BRPM | HEIGHT: 64 IN | HEART RATE: 65 BPM | OXYGEN SATURATION: 94 % | WEIGHT: 185 LBS | SYSTOLIC BLOOD PRESSURE: 108 MMHG

## 2023-09-16 DIAGNOSIS — J03.90 EXUDATIVE TONSILLITIS: ICD-10-CM

## 2023-09-16 LAB — S PYO DNA SPEC NAA+PROBE: DETECTED

## 2023-09-16 PROCEDURE — 3074F SYST BP LT 130 MM HG: CPT | Performed by: NURSE PRACTITIONER

## 2023-09-16 PROCEDURE — 87651 STREP A DNA AMP PROBE: CPT | Performed by: NURSE PRACTITIONER

## 2023-09-16 PROCEDURE — 99213 OFFICE O/P EST LOW 20 MIN: CPT | Performed by: NURSE PRACTITIONER

## 2023-09-16 PROCEDURE — 3078F DIAST BP <80 MM HG: CPT | Performed by: NURSE PRACTITIONER

## 2023-09-16 RX ORDER — LEVOTHYROXINE SODIUM 0.1 MG/1
TABLET ORAL
COMMUNITY
Start: 2023-08-16

## 2023-09-16 RX ORDER — AMOXICILLIN 875 MG/1
875 TABLET, COATED ORAL 2 TIMES DAILY
Qty: 20 TABLET | Refills: 0 | Status: SHIPPED | OUTPATIENT
Start: 2023-09-16 | End: 2023-09-26

## 2023-09-16 RX ORDER — DEXAMETHASONE SODIUM PHOSPHATE 10 MG/ML
10 INJECTION INTRAMUSCULAR; INTRAVENOUS ONCE
Status: COMPLETED | OUTPATIENT
Start: 2023-09-16 | End: 2023-09-16

## 2023-09-16 RX ADMIN — DEXAMETHASONE SODIUM PHOSPHATE 10 MG: 10 INJECTION INTRAMUSCULAR; INTRAVENOUS at 11:59

## 2023-09-16 NOTE — PROGRESS NOTES
"Subjective:     Bernard Gutierrez is a 28 y.o. female who presents for Pharyngitis (X3-4 days sore throat, fever difficulty swallowing, cough, congestion,  )      Pharyngitis       Pt presents for evaluation of a new problem. Bernard is a 28-year-old female presents to urgent care today with complaints of a severe sore throat, headache, dry cough, fever and painful swallowing x3 days.  Her symptoms have progressively worsened.  She has been using Tylenol for relief of her discomfort.  This has not provided any relief.  She notes that her kids were ill earlier this week and have now resolved their illness.    ROS    PMH:   Past Medical History:   Diagnosis Date    Unspecified disorder of thyroid      ALLERGIES: No Known Allergies  SURGHX:   Past Surgical History:   Procedure Laterality Date    THYROIDECTOMY TOTAL  6/25/2014    Performed by Nahun Angulo M.D. at SURGERY [facility]    PRIMARY C SECTION       SOCHX:   Social History     Socioeconomic History    Marital status:    Tobacco Use    Smoking status: Former     Current packs/day: 0.50     Average packs/day: 0.5 packs/day for 4.0 years (2.0 ttl pk-yrs)     Types: Cigarettes    Smokeless tobacco: Never   Substance and Sexual Activity    Alcohol use: No     Comment: 1 per week    Drug use: No    Sexual activity: Yes     Partners: Male     Birth control/protection: Pill     Comment: script for micronor to pharmacy.      FH: No family history on file.      Objective:   /72   Pulse 65   Temp 36.5 °C (97.7 °F) (Temporal)   Resp 16   Ht 1.626 m (5' 4\")   Wt 83.9 kg (185 lb)   LMP 09/16/2023   SpO2 94%   BMI 31.76 kg/m²     Physical Exam  Vitals and nursing note reviewed.   Constitutional:       General: She is not in acute distress.     Appearance: Normal appearance. She is not ill-appearing.   HENT:      Head: Normocephalic and atraumatic.      Right Ear: External ear normal.      Left Ear: External ear normal.      Nose: No congestion " or rhinorrhea.      Mouth/Throat:      Mouth: Mucous membranes are moist.      Pharynx: Uvula midline. Pharyngeal swelling, oropharyngeal exudate and posterior oropharyngeal erythema present. No uvula swelling.      Tonsils: Tonsillar exudate present. No tonsillar abscesses. 4+ on the right. 4+ on the left.   Eyes:      Extraocular Movements: Extraocular movements intact.      Pupils: Pupils are equal, round, and reactive to light.   Cardiovascular:      Rate and Rhythm: Normal rate and regular rhythm.      Pulses: Normal pulses.      Heart sounds: Normal heart sounds.   Pulmonary:      Effort: Pulmonary effort is normal.      Breath sounds: Normal breath sounds.   Abdominal:      General: Abdomen is flat. Bowel sounds are normal.      Palpations: Abdomen is soft.      Tenderness: There is abdominal tenderness. There is no right CVA tenderness or left CVA tenderness.   Musculoskeletal:         General: Normal range of motion.      Cervical back: Normal range of motion and neck supple.   Skin:     General: Skin is warm and dry.      Capillary Refill: Capillary refill takes less than 2 seconds.   Neurological:      General: No focal deficit present.      Mental Status: She is alert and oriented to person, place, and time. Mental status is at baseline.   Psychiatric:         Mood and Affect: Mood normal.         Behavior: Behavior normal.         Thought Content: Thought content normal.         Judgment: Judgment normal.       Results for orders placed or performed in visit on 09/16/23   POCT GROUP A STREP, PCR   Result Value Ref Range    POC Group A Strep, PCR Detected (A) Not Detected, Invalid       Assessment/Plan:   Assessment    1. Exudative tonsillitis  POCT GROUP A STREP, PCR    amoxicillin (AMOXIL) 875 MG tablet    dexamethasone (Decadron) injection (check route below) 10 mg      She did test positive for strep in the clinic today.  I did encourage her to bring her kids in for testing as it is possible that  they are suffering from strep as well.  Antibiotic sent to pharmacy for treatment.  She was given Decadron in clinic for relief of swelling of tonsils. We discussed supportive measures including humidifier, warm salt water gargles, over-the-counter Cepacol throat lozenges, rest  and increased fluids. Pt was encouraged to seek treatment back in the ER or urgent care for worsening symptoms,  fever greater than 100.5, wheezes or shortness of breath.

## 2023-10-17 ENCOUNTER — OFFICE VISIT (OUTPATIENT)
Dept: URGENT CARE | Facility: CLINIC | Age: 28
End: 2023-10-17

## 2023-10-17 VITALS
OXYGEN SATURATION: 95 % | SYSTOLIC BLOOD PRESSURE: 98 MMHG | TEMPERATURE: 99 F | DIASTOLIC BLOOD PRESSURE: 60 MMHG | HEART RATE: 84 BPM | BODY MASS INDEX: 33.5 KG/M2 | RESPIRATION RATE: 16 BRPM | HEIGHT: 64 IN | WEIGHT: 196.21 LBS

## 2023-10-17 DIAGNOSIS — Z02.1 PRE-EMPLOYMENT DRUG SCREENING: ICD-10-CM

## 2023-10-17 DIAGNOSIS — Z02.1 PHYSICAL EXAM, PRE-EMPLOYMENT: ICD-10-CM

## 2023-10-17 LAB
AMP AMPHETAMINE: NORMAL
COC COCAINE: NORMAL
INT CON NEG: NORMAL
INT CON POS: NORMAL
MET METHAMPHETAMINES: NORMAL
OPI OPIATES: NORMAL
PCP PHENCYCLIDINE: NORMAL
POC DRUG COMMENT 753798-OCCUPATIONAL HEALTH: NORMAL
THC: NORMAL

## 2023-10-17 PROCEDURE — 8915 PR COMPREHENSIVE PHYSICAL: Performed by: PHYSICIAN ASSISTANT

## 2023-10-17 PROCEDURE — 3078F DIAST BP <80 MM HG: CPT | Performed by: PHYSICIAN ASSISTANT

## 2023-10-17 PROCEDURE — 80305 DRUG TEST PRSMV DIR OPT OBS: CPT | Performed by: PHYSICIAN ASSISTANT

## 2023-10-17 PROCEDURE — 3074F SYST BP LT 130 MM HG: CPT | Performed by: PHYSICIAN ASSISTANT

## 2023-10-17 NOTE — PROGRESS NOTES
See scanned history and physical.  Patient denies any history of seizures, dialysis, insulin use, pacemaker/defibrillator, syncope, arrhythmias/murmurs, vertigo/meniere's disease, illicit drug use, regular sedating medication use, ETOH abuse, or any weakness/paresthesias to extremities.